# Patient Record
Sex: FEMALE | Race: WHITE | NOT HISPANIC OR LATINO | URBAN - METROPOLITAN AREA
[De-identification: names, ages, dates, MRNs, and addresses within clinical notes are randomized per-mention and may not be internally consistent; named-entity substitution may affect disease eponyms.]

---

## 2017-01-10 ENCOUNTER — OUTPATIENT (OUTPATIENT)
Dept: OUTPATIENT SERVICES | Facility: HOSPITAL | Age: 62
LOS: 1 days | End: 2017-01-10
Payer: COMMERCIAL

## 2017-01-10 ENCOUNTER — APPOINTMENT (OUTPATIENT)
Dept: MAMMOGRAPHY | Facility: CLINIC | Age: 62
End: 2017-01-10

## 2017-01-10 DIAGNOSIS — Z00.00 ENCOUNTER FOR GENERAL ADULT MEDICAL EXAMINATION WITHOUT ABNORMAL FINDINGS: ICD-10-CM

## 2017-01-10 PROCEDURE — 77063 BREAST TOMOSYNTHESIS BI: CPT

## 2017-01-10 PROCEDURE — 77067 SCR MAMMO BI INCL CAD: CPT

## 2017-07-03 ENCOUNTER — EMERGENCY (EMERGENCY)
Facility: HOSPITAL | Age: 62
LOS: 1 days | Discharge: HOME | End: 2017-07-03
Attending: EMERGENCY MEDICINE
Payer: COMMERCIAL

## 2017-07-03 VITALS
TEMPERATURE: 98 F | HEART RATE: 70 BPM | RESPIRATION RATE: 20 BRPM | SYSTOLIC BLOOD PRESSURE: 136 MMHG | WEIGHT: 184.97 LBS | DIASTOLIC BLOOD PRESSURE: 88 MMHG | OXYGEN SATURATION: 97 %

## 2017-07-03 PROCEDURE — 25605 CLTX DST RDL FX/EPHYS SEP W/: CPT | Mod: LT

## 2017-07-03 PROCEDURE — 73110 X-RAY EXAM OF WRIST: CPT

## 2017-07-03 PROCEDURE — 73100 X-RAY EXAM OF WRIST: CPT

## 2017-07-03 PROCEDURE — 99284 EMERGENCY DEPT VISIT MOD MDM: CPT | Mod: 25

## 2017-07-03 PROCEDURE — 99285 EMERGENCY DEPT VISIT HI MDM: CPT | Mod: 25

## 2017-07-03 PROCEDURE — 73110 X-RAY EXAM OF WRIST: CPT | Mod: 26,76,59

## 2017-07-03 RX ORDER — IBUPROFEN 200 MG
1 TABLET ORAL
Qty: 20 | Refills: 0 | OUTPATIENT
Start: 2017-07-03 | End: 2017-07-08

## 2017-07-03 RX ORDER — IBUPROFEN 200 MG
600 TABLET ORAL ONCE
Qty: 0 | Refills: 0 | Status: COMPLETED | OUTPATIENT
Start: 2017-07-03 | End: 2017-07-03

## 2017-07-03 RX ADMIN — Medication 600 MILLIGRAM(S): at 08:53

## 2017-07-03 NOTE — ED STATDOCS - ATTENDING CONTRIBUTION TO CARE
I, Eddy Hilliard, performed the initial face to face bedside interview with this patient regarding history of present illness, review of symptoms and relevant past medical, social and family history.  I completed an independent physical examination.  I was the provider who initially evaluated this patient.  The history, relevant review of systems, past medical and surgical history, medical decision making, and physical examination was documented by the scribe in my presence and I attest to the accuracy of the documentation. Follow-up on ordered tests (ie labs, radiologic studies) and re-evaluation of the patient's status has been communicated to the ACP.  Disposition of the patient will be based on test outcome and response to ED interventions.

## 2017-07-03 NOTE — PROCEDURE NOTE - NSPOSTCAREGUIDE_GEN_A_CORE
Verbal/written post procedure instructions were given to patient/caregiver/Keep the cast/splint/dressing clean and dry/Understanding of care for injured extremity verbalized/Elevate the injured extremity as instructed

## 2017-07-03 NOTE — ED STATDOCS - OBJECTIVE STATEMENT
63 y/o F presents to ED c/o L hand pain s/p fall yesterday. Pt states she slipped going her pool yesterday and fell on her buttocks and L hand. Pain is exacerbated with movement. Pt is right hand dominant. Denies numbness, tingling or weakness to affected area. PMD: Dr. Charles 61 y/o F presents to ED c/o L wrist/hand pain s/p fall yesterday. Pt states she slipped going her pool yesterday and fell on her buttocks and L hand. Pain is exacerbated with movement. Pt is right hand dominant. Denies numbness, tingling or weakness to affected area. PMD: Dr. Charles

## 2017-07-03 NOTE — ED STATDOCS - PROGRESS NOTE DETAILS
+FX to distal radius ortho called for evaluation patient re-evaluated c/o left wrist pain s/o fall yesterday, PE: +swelling to radius region, limited ROM secondary to pain, cap refill < 2sec, motor and sensory sensation intact, skin warm and dry. XR consisted with FX, pending ortho eval, plan sling ice, elevate, f/u outpatient with ortho

## 2017-07-03 NOTE — ED STATDOCS - MUSCULOSKELETAL, MLM
Mild snuff box TTP. TTP over the radial styloid process. No ulnar styloid TTP. FROM into elbow of the LUE. STS of left wrist. Mild snuff box TTP. TTP over the radial styloid process. No ulnar styloid TTP. FROM into elbow of the LUE.

## 2017-07-03 NOTE — ED ADULT NURSE NOTE - OBJECTIVE STATEMENT
pt received in supertrack. pt is awake alert oriented following commands and speaking coherently. presents to er complaining of left wrist pain secondary to a fall yesterday. 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 she denies hitting her head. strong radial pulse to left wrist. swelling and deformity noted. no other complaints. resp even and unlabored. limited ROM to left hand

## 2017-07-03 NOTE — CONSULT NOTE ADULT - SUBJECTIVE AND OBJECTIVE BOX
asked to ED to evaluate 63 y/o female with left distal radius fx s/p mechanical fall last night at aprox 8pm. nstates was gettng into pool and slipped on pool steps falling back on outstreched hand. denies head trauma, LOC. Reports no other significant complaints.    PAST MEDICAL & SURGICAL HISTORY:  Denies    Vital Signs Last 24 Hrs  T(C): 36.8 (03 Jul 2017 07:59), Max: 36.8 (03 Jul 2017 07:59)  T(F): 98.2 (03 Jul 2017 07:59), Max: 98.2 (03 Jul 2017 07:59)  HR: 70 (03 Jul 2017 07:59) (70 - 70)  BP: 136/88 (03 Jul 2017 07:59) (136/88 - 136/88)  BP(mean): --  RR: 20 (03 Jul 2017 07:59) (20 - 20)  SpO2: 97% (03 Jul 2017 07:59) (97% - 97%) asked to ED to evaluate 61 y/o female with left distal radius fx s/p mechanical fall last night at aprox 8pm. nstates was gettng into pool and slipped on pool steps falling back on outstreched hand. denies head trauma, LOC. Reports no other significant complaints.    PAST MEDICAL & SURGICAL HISTORY:  Denies    MEDS: Denies    Allergies    No Known Allergies    Intolerances    ROS  musculoskeletal:   left wrist pain and swelling, denies N/T  all other pertinent ROS negative    Vital Signs Last 24 Hrs  T(C): 36.8 (03 Jul 2017 07:59), Max: 36.8 (03 Jul 2017 07:59)  T(F): 98.2 (03 Jul 2017 07:59), Max: 98.2 (03 Jul 2017 07:59)  HR: 70 (03 Jul 2017 07:59) (70 - 70)  BP: 136/88 (03 Jul 2017 07:59) (136/88 - 136/88)  BP(mean): --  RR: 20 (03 Jul 2017 07:59) (20 - 20)  SpO2: 97% (03 Jul 2017 07:59) (97% - 97%)    PE LUE:  + left wrist swelling, mild no obvious deformity slight palpable deformity, mild ecchymosis medially, skin intact  gross motor intact: + intrinsic and thumb ROM  gross distal sensation intact to light touch  pulses appreciated, cap refill < 2 sec    A/P 61 y/o female with left distal radius fx    hematoma block and fracture reduction performed, sugar tong splint applied, (see procedure note)  pain control  maintain splint, keep clean and dry, elevate extremity to help reduce swelling  shoulder sling provided for comfort  follow up with Dr. Cardoza in one week

## 2017-07-11 ENCOUNTER — APPOINTMENT (OUTPATIENT)
Dept: ORTHOPEDIC SURGERY | Facility: CLINIC | Age: 62
End: 2017-07-11

## 2017-07-11 VITALS
DIASTOLIC BLOOD PRESSURE: 90 MMHG | HEART RATE: 60 BPM | BODY MASS INDEX: 26.66 KG/M2 | HEIGHT: 69 IN | SYSTOLIC BLOOD PRESSURE: 140 MMHG | WEIGHT: 180 LBS

## 2017-07-11 DIAGNOSIS — Z82.62 FAMILY HISTORY OF OSTEOPOROSIS: ICD-10-CM

## 2017-07-11 DIAGNOSIS — S62.102A FRACTURE OF UNSPECIFIED CARPAL BONE, LEFT WRIST, INITIAL ENCOUNTER FOR CLOSED FRACTURE: ICD-10-CM

## 2017-07-11 DIAGNOSIS — Z82.61 FAMILY HISTORY OF ARTHRITIS: ICD-10-CM

## 2017-08-01 ENCOUNTER — APPOINTMENT (OUTPATIENT)
Dept: ORTHOPEDIC SURGERY | Facility: CLINIC | Age: 62
End: 2017-08-01
Payer: COMMERCIAL

## 2017-08-01 VITALS
HEART RATE: 55 BPM | DIASTOLIC BLOOD PRESSURE: 89 MMHG | SYSTOLIC BLOOD PRESSURE: 150 MMHG | WEIGHT: 180 LBS | HEIGHT: 69 IN | BODY MASS INDEX: 26.66 KG/M2

## 2017-08-01 DIAGNOSIS — S52.532A COLLES' FRACTURE OF LEFT RADIUS, INITIAL ENCOUNTER FOR CLOSED FRACTURE: ICD-10-CM

## 2017-08-01 PROCEDURE — 73110 X-RAY EXAM OF WRIST: CPT | Mod: LT

## 2017-08-01 PROCEDURE — 99024 POSTOP FOLLOW-UP VISIT: CPT

## 2017-08-07 ENCOUNTER — OUTPATIENT (OUTPATIENT)
Dept: OUTPATIENT SERVICES | Facility: HOSPITAL | Age: 62
LOS: 1 days | End: 2017-08-07
Payer: COMMERCIAL

## 2017-08-07 DIAGNOSIS — Z51.89 ENCOUNTER FOR OTHER SPECIFIED AFTERCARE: ICD-10-CM

## 2017-08-07 DIAGNOSIS — S52.532D COLLES' FRACTURE OF LEFT RADIUS, SUBSEQUENT ENCOUNTER FOR CLOSED FRACTURE WITH ROUTINE HEALING: ICD-10-CM

## 2017-08-07 DIAGNOSIS — M25.632 STIFFNESS OF LEFT WRIST, NOT ELSEWHERE CLASSIFIED: ICD-10-CM

## 2017-09-14 ENCOUNTER — APPOINTMENT (OUTPATIENT)
Dept: ORTHOPEDIC SURGERY | Facility: CLINIC | Age: 62
End: 2017-09-14
Payer: COMMERCIAL

## 2017-09-14 VITALS
BODY MASS INDEX: 26.66 KG/M2 | HEIGHT: 69 IN | SYSTOLIC BLOOD PRESSURE: 132 MMHG | HEART RATE: 62 BPM | DIASTOLIC BLOOD PRESSURE: 88 MMHG | WEIGHT: 180 LBS

## 2017-09-14 DIAGNOSIS — S52.532D COLLES' FRACTURE OF LEFT RADIUS, SUBSEQUENT ENCOUNTER FOR CLOSED FRACTURE WITH ROUTINE HEALING: ICD-10-CM

## 2017-09-14 PROCEDURE — 99024 POSTOP FOLLOW-UP VISIT: CPT

## 2017-09-14 PROCEDURE — 73110 X-RAY EXAM OF WRIST: CPT | Mod: LT

## 2017-11-14 PROCEDURE — 97750 PHYSICAL PERFORMANCE TEST: CPT

## 2017-11-14 PROCEDURE — 97140 MANUAL THERAPY 1/> REGIONS: CPT

## 2017-11-14 PROCEDURE — 97110 THERAPEUTIC EXERCISES: CPT

## 2017-11-14 PROCEDURE — 97166 OT EVAL MOD COMPLEX 45 MIN: CPT

## 2017-11-17 ENCOUNTER — APPOINTMENT (OUTPATIENT)
Dept: NEUROLOGY | Facility: CLINIC | Age: 62
End: 2017-11-17
Payer: COMMERCIAL

## 2017-11-17 VITALS
WEIGHT: 178 LBS | SYSTOLIC BLOOD PRESSURE: 132 MMHG | BODY MASS INDEX: 26.36 KG/M2 | HEIGHT: 69 IN | DIASTOLIC BLOOD PRESSURE: 80 MMHG

## 2017-11-17 DIAGNOSIS — R25.3 FASCICULATION: ICD-10-CM

## 2017-11-17 PROCEDURE — 99204 OFFICE O/P NEW MOD 45 MIN: CPT

## 2017-11-27 ENCOUNTER — APPOINTMENT (OUTPATIENT)
Dept: ORTHOPEDIC SURGERY | Facility: CLINIC | Age: 62
End: 2017-11-27

## 2017-12-28 ENCOUNTER — OTHER (OUTPATIENT)
Age: 62
End: 2017-12-28

## 2018-01-15 ENCOUNTER — APPOINTMENT (OUTPATIENT)
Dept: GASTROENTEROLOGY | Facility: CLINIC | Age: 63
End: 2018-01-15

## 2018-02-13 ENCOUNTER — APPOINTMENT (OUTPATIENT)
Dept: MAMMOGRAPHY | Facility: CLINIC | Age: 63
End: 2018-02-13

## 2018-03-31 ENCOUNTER — EMERGENCY (EMERGENCY)
Facility: HOSPITAL | Age: 63
LOS: 1 days | Discharge: HOME | End: 2018-03-31
Attending: EMERGENCY MEDICINE
Payer: COMMERCIAL

## 2018-03-31 VITALS
RESPIRATION RATE: 18 BRPM | DIASTOLIC BLOOD PRESSURE: 76 MMHG | HEART RATE: 98 BPM | SYSTOLIC BLOOD PRESSURE: 105 MMHG | TEMPERATURE: 98 F | OXYGEN SATURATION: 98 %

## 2018-03-31 VITALS — WEIGHT: 177.91 LBS | HEIGHT: 69 IN

## 2018-03-31 DIAGNOSIS — Z79.1 LONG TERM (CURRENT) USE OF NON-STEROIDAL ANTI-INFLAMMATORIES (NSAID): ICD-10-CM
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
## 2018-03-31 DIAGNOSIS — S70.02XA CONTUSION OF LEFT HIP, INITIAL ENCOUNTER: ICD-10-CM
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
## 2018-03-31 DIAGNOSIS — Z79.899 OTHER LONG TERM (CURRENT) DRUG THERAPY: ICD-10-CM

## 2018-03-31 DIAGNOSIS — M25.552 PAIN IN LEFT HIP: ICD-10-CM
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
## 2018-03-31 PROCEDURE — 99284 EMERGENCY DEPT VISIT MOD MDM: CPT | Mod: 25

## 2018-03-31 PROCEDURE — 96372 THER/PROPH/DIAG INJ SC/IM: CPT

## 2018-03-31 PROCEDURE — 72192 CT PELVIS W/O DYE: CPT

## 2018-03-31 PROCEDURE — 76377 3D RENDER W/INTRP POSTPROCES: CPT | Mod: 26

## 2018-03-31 PROCEDURE — 72192 CT PELVIS W/O DYE: CPT | Mod: 26

## 2018-03-31 PROCEDURE — 76377 3D RENDER W/INTRP POSTPROCES: CPT

## 2018-03-31 RX ORDER — MORPHINE SULFATE 50 MG/1
4 CAPSULE, EXTENDED RELEASE ORAL ONCE
Qty: 0 | Refills: 0 | Status: DISCONTINUED | OUTPATIENT
Start: 2018-03-31 | End: 2018-03-31

## 2018-03-31 RX ORDER — OXYCODONE AND ACETAMINOPHEN 5; 325 MG/1; MG/1
1 TABLET ORAL ONCE
Qty: 0 | Refills: 0 | Status: DISCONTINUED | OUTPATIENT
Start: 2018-03-31 | End: 2018-03-31

## 2018-03-31 RX ORDER — KETOROLAC TROMETHAMINE 30 MG/ML
1 SYRINGE (ML) INJECTION
Qty: 6 | Refills: 0 | OUTPATIENT
Start: 2018-03-31 | End: 2018-04-01

## 2018-03-31 RX ORDER — KETOROLAC TROMETHAMINE 30 MG/ML
30 SYRINGE (ML) INJECTION ONCE
Qty: 0 | Refills: 0 | Status: DISCONTINUED | OUTPATIENT
Start: 2018-03-31 | End: 2018-03-31

## 2018-03-31 RX ADMIN — MORPHINE SULFATE 4 MILLIGRAM(S): 50 CAPSULE, EXTENDED RELEASE ORAL at 09:04

## 2018-03-31 RX ADMIN — MORPHINE SULFATE 4 MILLIGRAM(S): 50 CAPSULE, EXTENDED RELEASE ORAL at 09:27

## 2018-03-31 RX ADMIN — Medication 30 MILLIGRAM(S): at 11:10

## 2018-03-31 RX ADMIN — OXYCODONE AND ACETAMINOPHEN 1 TABLET(S): 5; 325 TABLET ORAL at 08:14

## 2018-03-31 RX ADMIN — OXYCODONE AND ACETAMINOPHEN 1 TABLET(S): 5; 325 TABLET ORAL at 09:04

## 2018-03-31 NOTE — ED PROVIDER NOTE - OBJECTIVE STATEMENT
Patient is a 64 y/o female c/o of left hip pain s/p fall. Patient states she was in the supermarket yesterday and when she bumped into a cart and fell. Patient states she fell onto her left hip. Patient states she was able to ambulate without difficulty following the injury. However, patient states as the day went on she noticed the pain worsened and she was unable to walk on her left leg. Patient admits this morning she had to use a walker for assistance. Patient

## 2018-03-31 NOTE — PHYSICAL THERAPY INITIAL EVALUATION ADULT - ADDITIONAL COMMENTS
owns a RW but does not use it, 2 steps to enter home s rail, flight of stairs within home to bedroom, states family very supportive and will help out throughout the day prn

## 2018-03-31 NOTE — PHYSICAL THERAPY INITIAL EVALUATION ADULT - LEVEL OF INDEPENDENCE: STAIR NEGOTIATION, REHAB EVAL
pt declined stairs due to nausea however agreed to performed modified task at bedside with education. 6'' step ups c rail modified I, no rails 6'' steps ups Tank, pt educated on use of RW for stair negotiation without questions or concerns pt declined stairs due to nausea however agreed to performed modified task at bedside with education. 6'' step ups c rail modified I and 6'' step c bowen axillary crutches S, no rails, attempted 2x, pt educated on use of bowen axillary crutches for stair negotiation without questions or concerns

## 2018-03-31 NOTE — ED ADULT NURSE NOTE - OBJECTIVE STATEMENT
pt alert and awake x3, arrived to ED with left hip pain s/p fall yesterday, pt ambulated with walker, unable to bare weight, denies hitting head, denies chest pain/sob.

## 2018-03-31 NOTE — ED ADULT TRIAGE NOTE - CHIEF COMPLAINT QUOTE
Patient arrived to ED today with c/o left hip pain after fall in a supermarket yesterday.  Patient walking with assistance with a walker due to injury.

## 2018-03-31 NOTE — ED PROVIDER NOTE - ATTENDING CONTRIBUTION TO CARE
I, Megan Ross, independently evaluated the patient. The PA made the initial evaluation and discussed history, physical and plan with me and I agree. I examined the patient noting a large left buttock hematoma, stable pelvis, and discussed need for CT, pain medication, ice and PT to see. I discussed indications to return to the ED and the importance of proper follow up with PMD. Patient verbalizes understanding and is comfortable with discharge at this time.

## 2018-03-31 NOTE — PROVIDER CONTACT NOTE (OTHER) - ASSESSMENT
PT ordered. chart reviewed and noted. pt received in ED, in supine, NAD, agreeable to PT, son present, PA discussion(no acute fracture, WBAT left LE agreeable to PT). pt tolerated session well, son and patient educated on functional mobility(stairs/gait/transfers) without questions or concerns. PA and RN aware of pain and function. left hip pain pre 5/10, post 7/10. pt left as received, son present, will follow

## 2018-03-31 NOTE — PROVIDER CONTACT NOTE (OTHER) - RECOMMENDATIONS
D/C recommendation: home, use of her RW, assist prn D/C recommendation: home, use of her RW vs bowen axillary crutches, assist prn

## 2018-03-31 NOTE — ED PROVIDER NOTE - PHYSICAL EXAMINATION
General: Well appearing, in no distress, sitting comfortably Eyes: PERRL, conjunctiva pink, sclera white bilaterally Cardio: Regular rate and rhythm, S1/S2, no murmurs Resp: Clear to auscultation bilaterally no wheezes, rales or rhonchi MSK: Tenderness over left hip, decreased ROM due to pain, neurovasculry intact, femoral pulse 2+ Skin: Warm, dry without ecchymosis or erythema Neuro: Alert and orientated, CN II-XII intact, finger to nose intact, muscle strength fair, gait without ataxia, reflexes intact

## 2018-07-16 ENCOUNTER — APPOINTMENT (OUTPATIENT)
Dept: GASTROENTEROLOGY | Facility: CLINIC | Age: 63
End: 2018-07-16
Payer: COMMERCIAL

## 2018-07-16 VITALS
BODY MASS INDEX: 24.88 KG/M2 | WEIGHT: 168 LBS | SYSTOLIC BLOOD PRESSURE: 116 MMHG | DIASTOLIC BLOOD PRESSURE: 92 MMHG | HEIGHT: 69 IN

## 2018-07-16 DIAGNOSIS — Z80.0 FAMILY HISTORY OF MALIGNANT NEOPLASM OF DIGESTIVE ORGANS: ICD-10-CM

## 2018-07-16 PROCEDURE — 99203 OFFICE O/P NEW LOW 30 MIN: CPT

## 2018-07-16 RX ORDER — VIT C/E/CUPERIC/ZINC/LUTEIN 226-90-0.8
CAPSULE ORAL
Refills: 0 | Status: ACTIVE | COMMUNITY

## 2018-07-17 PROBLEM — Z80.0 FAMILY HISTORY OF MALIGNANT NEOPLASM OF COLON: Status: ACTIVE | Noted: 2018-07-17

## 2018-07-20 ENCOUNTER — NON-APPOINTMENT (OUTPATIENT)
Age: 63
End: 2018-07-20

## 2018-07-20 ENCOUNTER — APPOINTMENT (OUTPATIENT)
Dept: INTERNAL MEDICINE | Facility: CLINIC | Age: 63
End: 2018-07-20
Payer: COMMERCIAL

## 2018-07-20 VITALS
HEIGHT: 69 IN | SYSTOLIC BLOOD PRESSURE: 115 MMHG | WEIGHT: 171.2 LBS | BODY MASS INDEX: 25.36 KG/M2 | DIASTOLIC BLOOD PRESSURE: 80 MMHG

## 2018-07-20 DIAGNOSIS — M20.30 HALLUX VARUS (ACQUIRED), UNSPECIFIED FOOT: ICD-10-CM

## 2018-07-20 PROCEDURE — 99386 PREV VISIT NEW AGE 40-64: CPT | Mod: 25

## 2018-07-20 PROCEDURE — 93000 ELECTROCARDIOGRAM COMPLETE: CPT

## 2018-07-20 RX ORDER — SODIUM SULFATE, POTASSIUM SULFATE, MAGNESIUM SULFATE 17.5; 3.13; 1.6 G/ML; G/ML; G/ML
17.5-3.13-1.6 SOLUTION, CONCENTRATE ORAL
Qty: 1 | Refills: 0 | Status: DISCONTINUED | COMMUNITY
Start: 2018-07-16 | End: 2018-07-20

## 2018-07-20 NOTE — PLAN
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 She is overall feeling well.  She brings in some blood work from 2 years ago It   showed mildly elevated cholesterol high HDL. Overall feels okay. She is scheduled for colonoscopy with Dr. Canales. I will send her for a mammogram\par . I will check yearly bloodwork constance cholesterol. EKG Done HERE NORMAL WILL D/W RADIOLOGY INCIDNETAL ATHEROSCLEROSIS   ON CT SCAN

## 2018-07-20 NOTE — HISTORY OF PRESENT ILLNESS
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

## 2018-08-27 LAB
ALBUMIN SERPL ELPH-MCNC: 4.4 G/DL
ALP BLD-CCNC: 46 U/L
ALT SERPL-CCNC: 21 U/L
ANION GAP SERPL CALC-SCNC: 11 MMOL/L
AST SERPL-CCNC: 24 U/L
BASOPHILS # BLD AUTO: 0.04 K/UL
BASOPHILS NFR BLD AUTO: 0.8 %
BILIRUB SERPL-MCNC: 0.4 MG/DL
BUN SERPL-MCNC: 21 MG/DL
CALCIUM SERPL-MCNC: 9.3 MG/DL
CHLORIDE SERPL-SCNC: 100 MMOL/L
CHOLEST SERPL-MCNC: 216 MG/DL
CHOLEST/HDLC SERPL: 2.5 RATIO
CO2 SERPL-SCNC: 25 MMOL/L
CREAT SERPL-MCNC: 0.73 MG/DL
EOSINOPHIL # BLD AUTO: 0.23 K/UL
EOSINOPHIL NFR BLD AUTO: 4.8 %
GLUCOSE SERPL-MCNC: 79 MG/DL
HCT VFR BLD CALC: 41.3 %
HDLC SERPL-MCNC: 85 MG/DL
HGB BLD-MCNC: 13.2 G/DL
IMM GRANULOCYTES NFR BLD AUTO: 0.2 %
INR PPP: 0.99 RATIO
LDLC SERPL CALC-MCNC: 121 MG/DL
LYMPHOCYTES # BLD AUTO: 2.15 K/UL
LYMPHOCYTES NFR BLD AUTO: 44.7 %
MAN DIFF?: NORMAL
MCHC RBC-ENTMCNC: 30.8 PG
MCHC RBC-ENTMCNC: 32 GM/DL
MCV RBC AUTO: 96.5 FL
MONOCYTES # BLD AUTO: 0.45 K/UL
MONOCYTES NFR BLD AUTO: 9.4 %
NEUTROPHILS # BLD AUTO: 1.93 K/UL
NEUTROPHILS NFR BLD AUTO: 40.1 %
PLATELET # BLD AUTO: 251 K/UL
POTASSIUM SERPL-SCNC: 4.2 MMOL/L
PROT SERPL-MCNC: 6.2 G/DL
PT BLD: 11.2 SEC
RBC # BLD: 4.28 M/UL
RBC # FLD: 14.6 %
SODIUM SERPL-SCNC: 136 MMOL/L
TRIGL SERPL-MCNC: 49 MG/DL
WBC # FLD AUTO: 4.81 K/UL

## 2018-08-28 LAB
ESTIMATED AVERAGE GLUCOSE: 103 MG/DL
HBA1C MFR BLD HPLC: 5.2 %

## 2018-09-06 ENCOUNTER — OUTPATIENT (OUTPATIENT)
Dept: OUTPATIENT SERVICES | Facility: HOSPITAL | Age: 63
LOS: 1 days | End: 2018-09-06
Payer: COMMERCIAL

## 2018-09-06 ENCOUNTER — APPOINTMENT (OUTPATIENT)
Dept: GASTROENTEROLOGY | Facility: GI CENTER | Age: 63
End: 2018-09-06
Payer: COMMERCIAL

## 2018-09-06 DIAGNOSIS — Z12.11 ENCOUNTER FOR SCREENING FOR MALIGNANT NEOPLASM OF COLON: ICD-10-CM

## 2018-09-06 DIAGNOSIS — K57.30 DIVERTICULOSIS OF LARGE INTESTINE W/OUT PERFORATION OR ABSCESS W/OUT BLEEDING: ICD-10-CM

## 2018-09-06 DIAGNOSIS — K64.8 OTHER HEMORRHOIDS: ICD-10-CM

## 2018-09-06 PROCEDURE — G0121: CPT

## 2018-09-06 PROCEDURE — 45378 DIAGNOSTIC COLONOSCOPY: CPT

## 2018-09-13 ENCOUNTER — OTHER (OUTPATIENT)
Age: 63
End: 2018-09-13

## 2018-10-09 LAB
ALBUMIN SERPL ELPH-MCNC: 4.4 G/DL
ALP BLD-CCNC: 47 U/L
ALT SERPL-CCNC: 21 U/L
ANION GAP SERPL CALC-SCNC: 15 MMOL/L
APPEARANCE: CLEAR
AST SERPL-CCNC: 24 U/L
BASOPHILS # BLD AUTO: 0.05 K/UL
BASOPHILS NFR BLD AUTO: 1.1 %
BILIRUB SERPL-MCNC: 0.4 MG/DL
BILIRUBIN URINE: NEGATIVE
BLOOD URINE: NEGATIVE
BUN SERPL-MCNC: 22 MG/DL
CALCIUM SERPL-MCNC: 9.1 MG/DL
CHLORIDE SERPL-SCNC: 98 MMOL/L
CHOLEST SERPL-MCNC: 207 MG/DL
CHOLEST/HDLC SERPL: 2.5 RATIO
CO2 SERPL-SCNC: 24 MMOL/L
COLOR: YELLOW
CREAT SERPL-MCNC: 0.72 MG/DL
EOSINOPHIL # BLD AUTO: 0.21 K/UL
EOSINOPHIL NFR BLD AUTO: 4.4 %
GLUCOSE QUALITATIVE U: NEGATIVE MG/DL
GLUCOSE SERPL-MCNC: 86 MG/DL
HBA1C MFR BLD HPLC: 5.3 %
HCT VFR BLD CALC: 40.9 %
HDLC SERPL-MCNC: 84 MG/DL
HGB BLD-MCNC: 12.8 G/DL
IMM GRANULOCYTES NFR BLD AUTO: 0.2 %
INR PPP: 1.03 RATIO
KETONES URINE: NEGATIVE
LDLC SERPL CALC-MCNC: 113 MG/DL
LEUKOCYTE ESTERASE URINE: NEGATIVE
LYMPHOCYTES # BLD AUTO: 2.17 K/UL
LYMPHOCYTES NFR BLD AUTO: 45.9 %
MAN DIFF?: NORMAL
MCHC RBC-ENTMCNC: 29.4 PG
MCHC RBC-ENTMCNC: 31.3 GM/DL
MCV RBC AUTO: 93.8 FL
MONOCYTES # BLD AUTO: 0.4 K/UL
MONOCYTES NFR BLD AUTO: 8.5 %
NEUTROPHILS # BLD AUTO: 1.89 K/UL
NEUTROPHILS NFR BLD AUTO: 39.9 %
NITRITE URINE: NEGATIVE
PH URINE: 6.5
PLATELET # BLD AUTO: 230 K/UL
POTASSIUM SERPL-SCNC: 4.3 MMOL/L
PROT SERPL-MCNC: 6.1 G/DL
PROTEIN URINE: NEGATIVE MG/DL
PT BLD: 11.6 SEC
RBC # BLD: 4.36 M/UL
RBC # FLD: 14.7 %
SODIUM SERPL-SCNC: 137 MMOL/L
SPECIFIC GRAVITY URINE: 1.01
T4 SERPL-MCNC: 5.8 UG/DL
TRIGL SERPL-MCNC: 49 MG/DL
TSH SERPL-ACNC: 2.04 UIU/ML
UROBILINOGEN URINE: NEGATIVE MG/DL
WBC # FLD AUTO: 4.73 K/UL

## 2019-04-03 ENCOUNTER — APPOINTMENT (OUTPATIENT)
Dept: INTERNAL MEDICINE | Facility: CLINIC | Age: 64
End: 2019-04-03
Payer: COMMERCIAL

## 2019-04-03 VITALS
WEIGHT: 171 LBS | DIASTOLIC BLOOD PRESSURE: 94 MMHG | BODY MASS INDEX: 25.33 KG/M2 | SYSTOLIC BLOOD PRESSURE: 130 MMHG | HEIGHT: 69 IN

## 2019-04-03 DIAGNOSIS — R21 RASH AND OTHER NONSPECIFIC SKIN ERUPTION: ICD-10-CM

## 2019-04-03 PROCEDURE — 99203 OFFICE O/P NEW LOW 30 MIN: CPT

## 2019-04-03 NOTE — PHYSICAL EXAM

## 2019-04-03 NOTE — HISTORY OF PRESENT ILLNESS
[FreeTextEntry1] : rash [de-identified] : 64 year old woman with no significant past medical history is here with rash on her face and neck since 3 days ago.\par 4 days ago she had facial and started using the creams given in spa, one day after using the products rash and redness started apearing on her face and neck exactly the same areas she used the creams and had facial. \par She hasn't started any new medications or any other new clothes or cream or soap.\par

## 2019-04-03 NOTE — ASSESSMENT
[FreeTextEntry1] : Allergic reaction creams and new products\par Will stop using them.\par We will send steroid cream to be used twice a day for one week.\par Hydroxyzine 25 mg at bedtime, side effects discussed.\par If rash doesn't resolve in 3 or 4 days will come back.

## 2019-04-05 ENCOUNTER — APPOINTMENT (OUTPATIENT)
Dept: DERMATOLOGY | Facility: CLINIC | Age: 64
End: 2019-04-05
Payer: COMMERCIAL

## 2019-04-05 PROCEDURE — 99203 OFFICE O/P NEW LOW 30 MIN: CPT

## 2019-05-03 DIAGNOSIS — Z12.31 ENCOUNTER FOR SCREENING MAMMOGRAM FOR MALIGNANT NEOPLASM OF BREAST: ICD-10-CM

## 2019-05-06 ENCOUNTER — OUTPATIENT (OUTPATIENT)
Dept: OUTPATIENT SERVICES | Facility: HOSPITAL | Age: 64
LOS: 1 days | End: 2019-05-06
Payer: COMMERCIAL

## 2019-05-06 ENCOUNTER — APPOINTMENT (OUTPATIENT)
Dept: MAMMOGRAPHY | Facility: CLINIC | Age: 64
End: 2019-05-06
Payer: COMMERCIAL

## 2019-05-06 DIAGNOSIS — Z12.31 ENCOUNTER FOR SCREENING MAMMOGRAM FOR MALIGNANT NEOPLASM OF BREAST: ICD-10-CM

## 2019-05-06 PROCEDURE — 77063 BREAST TOMOSYNTHESIS BI: CPT | Mod: 26

## 2019-05-06 PROCEDURE — 77067 SCR MAMMO BI INCL CAD: CPT

## 2019-05-06 PROCEDURE — 77063 BREAST TOMOSYNTHESIS BI: CPT

## 2019-05-06 PROCEDURE — 77067 SCR MAMMO BI INCL CAD: CPT | Mod: 26

## 2019-05-17 ENCOUNTER — TRANSCRIPTION ENCOUNTER (OUTPATIENT)
Age: 64
End: 2019-05-17

## 2019-05-23 ENCOUNTER — APPOINTMENT (OUTPATIENT)
Dept: ORTHOPEDIC SURGERY | Facility: CLINIC | Age: 64
End: 2019-05-23
Payer: COMMERCIAL

## 2019-05-23 VITALS — BODY MASS INDEX: 25.33 KG/M2 | HEIGHT: 69 IN | WEIGHT: 171 LBS

## 2019-05-23 DIAGNOSIS — M17.11 UNILATERAL PRIMARY OSTEOARTHRITIS, RIGHT KNEE: ICD-10-CM

## 2019-05-23 PROCEDURE — 73562 X-RAY EXAM OF KNEE 3: CPT | Mod: RT

## 2019-05-23 PROCEDURE — 99213 OFFICE O/P EST LOW 20 MIN: CPT

## 2019-05-23 NOTE — DISCUSSION/SUMMARY
[de-identified] : Discussion had with patient, deferred injection\par Patient will follow up with Nett\par Patient aware must follow up with MD for further eval and treatment \par Patient will start Physical Therapy \par Patients questions were answered and patient is satisfied with today's visit\par

## 2019-05-23 NOTE — HISTORY OF PRESENT ILLNESS
[Pain Location] : pain [de-identified] : Patient is a 64-year-old female who presents complaining of right knee pain. Patient states pain started 2 weeks ago after standing for long periods. Patient states pain and presented after working on her house going up and down stairs. Patient p and the medial aspect of the knee. Patient has been taking anti-inflammatories which has decreased the pain. Positive clicking to the knee. Patient does have history of Baker's cyst to knee.

## 2019-05-23 NOTE — PHYSICAL EXAM
[UE/LE] : Sensory: Intact in bilateral upper & lower extremities [ALL] : dorsalis pedis, posterior tibial, femoral, popliteal, and radial 2+ and symmetric bilaterally [Normal] : Oriented to person, place, and time, insight and judgement were intact and the affect was normal [Poor Appearance] : well-appearing [Acute Distress] : not in acute distress [de-identified] : \par FROM to hip\par \par Skin Warm, Dry, no erythema, no lesions \par \par Knee \par stable\par anatomic alignment\par ROM 0-120\par Valgus/Varus Stress intact \par Effusion - mild\par Crepitus - positive\par Patella Grind - Negative \par Patella Apprehension - Negative \par Medial joint line tenderness - positive \par Lateral Joint line tenderness - Negative\par Elke Test - Negative  \par Lachman test - Negative \par Anterior Drawer Test -  Negative \par \par Distal Motor Function intact \par Sensation intact to light touch bilaterally \par Pulses 2+ bilaterally\par  [de-identified] : 3 view right knee reveals Moderate to severe PF OA

## 2019-05-27 PROBLEM — M17.11 PATELLOFEMORAL ARTHRITIS OF RIGHT KNEE: Status: ACTIVE | Noted: 2019-05-23

## 2019-07-05 ENCOUNTER — APPOINTMENT (OUTPATIENT)
Dept: DERMATOLOGY | Facility: CLINIC | Age: 64
End: 2019-07-05
Payer: COMMERCIAL

## 2019-07-05 PROCEDURE — 99214 OFFICE O/P EST MOD 30 MIN: CPT

## 2020-02-18 ENCOUNTER — FORM ENCOUNTER (OUTPATIENT)
Age: 65
End: 2020-02-18

## 2020-02-18 ENCOUNTER — APPOINTMENT (OUTPATIENT)
Dept: ORTHOPEDIC SURGERY | Facility: CLINIC | Age: 65
End: 2020-02-18
Payer: COMMERCIAL

## 2020-02-18 VITALS — HEIGHT: 69 IN | HEART RATE: 60 BPM | DIASTOLIC BLOOD PRESSURE: 93 MMHG | SYSTOLIC BLOOD PRESSURE: 144 MMHG

## 2020-02-18 DIAGNOSIS — S86.819A STRAIN OF OTHER MUSCLE(S) AND TENDON(S) AT LOWER LEG LEVEL, UNSPECIFIED LEG, INITIAL ENCOUNTER: ICD-10-CM

## 2020-02-18 PROCEDURE — 99213 OFFICE O/P EST LOW 20 MIN: CPT

## 2020-02-19 ENCOUNTER — APPOINTMENT (OUTPATIENT)
Dept: ULTRASOUND IMAGING | Facility: CLINIC | Age: 65
End: 2020-02-19
Payer: COMMERCIAL

## 2020-02-19 ENCOUNTER — OUTPATIENT (OUTPATIENT)
Dept: OUTPATIENT SERVICES | Facility: HOSPITAL | Age: 65
LOS: 1 days | End: 2020-02-19
Payer: COMMERCIAL

## 2020-02-19 DIAGNOSIS — Z00.8 ENCOUNTER FOR OTHER GENERAL EXAMINATION: ICD-10-CM

## 2020-02-19 PROCEDURE — 93971 EXTREMITY STUDY: CPT

## 2020-02-19 PROCEDURE — 93971 EXTREMITY STUDY: CPT | Mod: 26

## 2020-04-25 ENCOUNTER — MESSAGE (OUTPATIENT)
Age: 65
End: 2020-04-25

## 2020-05-05 LAB
SARS-COV-2 IGG SERPL IA-ACNC: <0.1 INDEX
SARS-COV-2 IGG SERPL QL IA: NEGATIVE

## 2020-07-06 ENCOUNTER — APPOINTMENT (OUTPATIENT)
Dept: DERMATOLOGY | Facility: CLINIC | Age: 65
End: 2020-07-06
Payer: COMMERCIAL

## 2020-07-06 PROCEDURE — 99214 OFFICE O/P EST MOD 30 MIN: CPT

## 2020-11-04 ENCOUNTER — APPOINTMENT (OUTPATIENT)
Dept: INTERNAL MEDICINE | Facility: CLINIC | Age: 65
End: 2020-11-04
Payer: COMMERCIAL

## 2020-11-04 ENCOUNTER — NON-APPOINTMENT (OUTPATIENT)
Age: 65
End: 2020-11-04

## 2020-11-04 VITALS
RESPIRATION RATE: 16 BRPM | TEMPERATURE: 97.1 F | SYSTOLIC BLOOD PRESSURE: 136 MMHG | DIASTOLIC BLOOD PRESSURE: 88 MMHG | HEIGHT: 69 IN | HEART RATE: 61 BPM

## 2020-11-04 DIAGNOSIS — G47.00 INSOMNIA, UNSPECIFIED: ICD-10-CM

## 2020-11-04 DIAGNOSIS — Z23 ENCOUNTER FOR IMMUNIZATION: ICD-10-CM

## 2020-11-04 PROCEDURE — 99072 ADDL SUPL MATRL&STAF TM PHE: CPT

## 2020-11-04 PROCEDURE — 93000 ELECTROCARDIOGRAM COMPLETE: CPT

## 2020-11-04 PROCEDURE — 90732 PPSV23 VACC 2 YRS+ SUBQ/IM: CPT

## 2020-11-04 PROCEDURE — G0009: CPT

## 2020-11-04 PROCEDURE — 99397 PER PM REEVAL EST PAT 65+ YR: CPT | Mod: 25

## 2020-11-04 PROCEDURE — 36415 COLL VENOUS BLD VENIPUNCTURE: CPT

## 2020-11-04 NOTE — ASSESSMENT
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

## 2020-11-04 NOTE — HISTORY OF PRESENT ILLNESS
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

## 2020-11-11 ENCOUNTER — TRANSCRIPTION ENCOUNTER (OUTPATIENT)
Age: 65
End: 2020-11-11

## 2020-11-15 LAB
25(OH)D3 SERPL-MCNC: 30.7 NG/ML
ALBUMIN SERPL ELPH-MCNC: 4.8 G/DL
ALP BLD-CCNC: 57 U/L
ALT SERPL-CCNC: 30 U/L
ANION GAP SERPL CALC-SCNC: 11 MMOL/L
APPEARANCE: CLEAR
AST SERPL-CCNC: 31 U/L
BASOPHILS # BLD AUTO: 0.08 K/UL
BASOPHILS NFR BLD AUTO: 1.2 %
BILIRUB SERPL-MCNC: 0.7 MG/DL
BILIRUBIN URINE: NEGATIVE
BLOOD URINE: NEGATIVE
BUN SERPL-MCNC: 12 MG/DL
CALCIUM SERPL-MCNC: 10.2 MG/DL
CHLORIDE SERPL-SCNC: 101 MMOL/L
CHOLEST SERPL-MCNC: 230 MG/DL
CO2 SERPL-SCNC: 27 MMOL/L
COLOR: NORMAL
CREAT SERPL-MCNC: 0.8 MG/DL
EOSINOPHIL # BLD AUTO: 0.44 K/UL
EOSINOPHIL NFR BLD AUTO: 6.5 %
ESTIMATED AVERAGE GLUCOSE: 108 MG/DL
GLUCOSE QUALITATIVE U: NEGATIVE
GLUCOSE SERPL-MCNC: 90 MG/DL
HBA1C MFR BLD HPLC: 5.4 %
HCT VFR BLD CALC: 47 %
HDLC SERPL-MCNC: 95 MG/DL
HGB BLD-MCNC: 15.2 G/DL
IMM GRANULOCYTES NFR BLD AUTO: 0.1 %
KETONES URINE: NEGATIVE
LDLC SERPL CALC-MCNC: 123 MG/DL
LEUKOCYTE ESTERASE URINE: NEGATIVE
LYMPHOCYTES # BLD AUTO: 2.63 K/UL
LYMPHOCYTES NFR BLD AUTO: 38.7 %
MAN DIFF?: NORMAL
MCHC RBC-ENTMCNC: 30.4 PG
MCHC RBC-ENTMCNC: 32.3 GM/DL
MCV RBC AUTO: 94 FL
MONOCYTES # BLD AUTO: 0.48 K/UL
MONOCYTES NFR BLD AUTO: 7.1 %
NEUTROPHILS # BLD AUTO: 3.15 K/UL
NEUTROPHILS NFR BLD AUTO: 46.4 %
NITRITE URINE: NEGATIVE
NONHDLC SERPL-MCNC: 134 MG/DL
PH URINE: 5.5
PLATELET # BLD AUTO: 259 K/UL
POTASSIUM SERPL-SCNC: 4.5 MMOL/L
PROT SERPL-MCNC: 6.9 G/DL
PROTEIN URINE: NEGATIVE
RBC # BLD: 5 M/UL
RBC # FLD: 13.2 %
SODIUM SERPL-SCNC: 139 MMOL/L
SPECIFIC GRAVITY URINE: 1
T4 SERPL-MCNC: 6.3 UG/DL
TRIGL SERPL-MCNC: 58 MG/DL
TSH SERPL-ACNC: 1.44 UIU/ML
UROBILINOGEN URINE: NORMAL
WBC # FLD AUTO: 6.79 K/UL

## 2020-12-12 ENCOUNTER — EMERGENCY (EMERGENCY)
Facility: HOSPITAL | Age: 65
LOS: 1 days | Discharge: HOME | End: 2020-12-12
Attending: EMERGENCY MEDICINE
Payer: COMMERCIAL

## 2020-12-12 VITALS
SYSTOLIC BLOOD PRESSURE: 126 MMHG | WEIGHT: 173.94 LBS | TEMPERATURE: 98 F | OXYGEN SATURATION: 97 % | HEART RATE: 79 BPM | HEIGHT: 69 IN | RESPIRATION RATE: 18 BRPM | DIASTOLIC BLOOD PRESSURE: 89 MMHG

## 2020-12-12 PROCEDURE — 99284 EMERGENCY DEPT VISIT MOD MDM: CPT | Mod: 57

## 2020-12-12 PROCEDURE — 73110 X-RAY EXAM OF WRIST: CPT

## 2020-12-12 PROCEDURE — 25605 CLTX DST RDL FX/EPHYS SEP W/: CPT | Mod: 54

## 2020-12-12 PROCEDURE — 73110 X-RAY EXAM OF WRIST: CPT | Mod: 26,RT,76

## 2020-12-12 PROCEDURE — 25605 CLTX DST RDL FX/EPHYS SEP W/: CPT | Mod: RT

## 2020-12-12 PROCEDURE — 99285 EMERGENCY DEPT VISIT HI MDM: CPT | Mod: 25

## 2020-12-12 RX ORDER — OXYCODONE AND ACETAMINOPHEN 5; 325 MG/1; MG/1
1 TABLET ORAL ONCE
Refills: 0 | Status: DISCONTINUED | OUTPATIENT
Start: 2020-12-12 | End: 2020-12-12

## 2020-12-12 RX ORDER — OXYCODONE AND ACETAMINOPHEN 5; 325 MG/1; MG/1
1 TABLET ORAL
Qty: 12 | Refills: 0
Start: 2020-12-12 | End: 2020-12-14

## 2020-12-12 RX ADMIN — OXYCODONE AND ACETAMINOPHEN 1 TABLET(S): 5; 325 TABLET ORAL at 15:41

## 2020-12-12 NOTE — ED PROVIDER NOTE - CARE PROVIDER_API CALL
Anna Schultz)  Orthopedics  [address] [place] [address], [address]  [place] [address]  Phone: [phone]  Fax: [phone]  Follow Up Time:

## 2020-12-12 NOTE — ED ADULT NURSE NOTE - CHIEF COMPLAINT QUOTE
Pt. complaining of right wrist pain s/p mechanical trip and fall on stoop earlier today.  Obvious deformity to right wrist.

## 2020-12-12 NOTE — ED ADULT NURSE NOTE - NSIMPLEMENTINTERV_GEN_ALL_ED
Implemented All Fall Risk Interventions:  [place] to call system. Call bell, personal items and telephone within reach. Instruct patient to call for assistance. Room bathroom lighting operational. Non-slip footwear when patient is off stretcher. Physically safe environment: no spills, clutter or unnecessary equipment. Stretcher in lowest position, wheels locked, appropriate side rails in place. Provide visual cue, wrist band, yellow gown, etc. Monitor gait and stability. Monitor for mental status changes and reorient to person, place, and time. Review medications for side effects contributing to fall risk. Reinforce activity limits and safety measures with patient and family.

## 2020-12-12 NOTE — ED PROVIDER NOTE - PROGRESS NOTE DETAILS
Fracture reduced and splinted. D/w Dr. Schultz-- pt stable for d/c with outpt f/u. Recommended to elevate RUE whenever possible.

## 2020-12-12 NOTE — ED ADULT NURSE NOTE - OBJECTIVE STATEMENT
Patient A&Ox4 complaining of pain to right wrist secondary to trip & fall up steps landing on left wrist. Stated pain is 4/10 at rest, 10/10 with movement. Positive CMS, denies any numbness tingling. Presents with swelling to left wrist.

## 2020-12-12 NOTE — ED PROVIDER NOTE - ATTENDING CONTRIBUTION TO CARE
65yoF; with no signif pmh; now p/w right wrist pain s/p slip and fall while trying to put out garbage.  c/o right wrist pain. denies numbness/tingling. denies focal weakness.  denies head trauma. denies loc.  EXAM: ttp @radial wrist with edema, no overlying abrasion. radial and ulnar pulses intact  A/P:  65yoF p/w right wrist pain  -xray, splint, f/up with ortho

## 2020-12-12 NOTE — ED PROVIDER NOTE - OBJECTIVE STATEMENT
65F presents to the ED c/o right wrist pain x 1 day. Pt states that she slipped and fell off her stoop. Pt denies hitting her head, LOC, neck/back pain, numbness/tingling and has no other complaints at this time.

## 2020-12-12 NOTE — ED PROVIDER NOTE - PATIENT PORTAL LINK FT
You can access the FollowMyHealth Patient Portal offered by [facility] by registering at the following website: [email or web address]. By joining [employer]’s FollowMyHealth portal, you will also be able to view your health information using other applications (apps) compatible with our system.

## 2020-12-16 ENCOUNTER — APPOINTMENT (OUTPATIENT)
Dept: ORTHOPEDIC SURGERY | Facility: CLINIC | Age: 65
End: 2020-12-16
Payer: COMMERCIAL

## 2020-12-16 VITALS
SYSTOLIC BLOOD PRESSURE: 148 MMHG | WEIGHT: 172 LBS | HEART RATE: 61 BPM | BODY MASS INDEX: 25.48 KG/M2 | DIASTOLIC BLOOD PRESSURE: 91 MMHG | HEIGHT: 69 IN

## 2020-12-16 VITALS — TEMPERATURE: 96.3 F

## 2020-12-16 PROCEDURE — 99072 ADDL SUPL MATRL&STAF TM PHE: CPT

## 2020-12-16 PROCEDURE — 99214 OFFICE O/P EST MOD 30 MIN: CPT

## 2020-12-23 ENCOUNTER — APPOINTMENT (OUTPATIENT)
Dept: ORTHOPEDIC SURGERY | Facility: CLINIC | Age: 65
End: 2020-12-23
Payer: COMMERCIAL

## 2020-12-23 VITALS
BODY MASS INDEX: 25.48 KG/M2 | SYSTOLIC BLOOD PRESSURE: 129 MMHG | HEART RATE: 66 BPM | WEIGHT: 172 LBS | HEIGHT: 69 IN | DIASTOLIC BLOOD PRESSURE: 78 MMHG

## 2020-12-23 PROCEDURE — 73110 X-RAY EXAM OF WRIST: CPT | Mod: RT

## 2020-12-23 PROCEDURE — 99213 OFFICE O/P EST LOW 20 MIN: CPT

## 2020-12-23 PROCEDURE — 99072 ADDL SUPL MATRL&STAF TM PHE: CPT

## 2020-12-30 ENCOUNTER — APPOINTMENT (OUTPATIENT)
Dept: ORTHOPEDIC SURGERY | Facility: CLINIC | Age: 65
End: 2020-12-30

## 2021-01-13 ENCOUNTER — APPOINTMENT (OUTPATIENT)
Dept: ORTHOPEDIC SURGERY | Facility: CLINIC | Age: 66
End: 2021-01-13
Payer: COMMERCIAL

## 2021-01-13 PROCEDURE — 73110 X-RAY EXAM OF WRIST: CPT | Mod: RT

## 2021-01-13 PROCEDURE — 99213 OFFICE O/P EST LOW 20 MIN: CPT

## 2021-01-13 PROCEDURE — 99072 ADDL SUPL MATRL&STAF TM PHE: CPT

## 2021-01-13 NOTE — PHYSICAL EXAM
[de-identified] : GENERAL: Awake, alert, cooperative, answers questions appropriately. No acute distress.  Ambulates independently with a normal gait.\par SKIN: Warm, dry, intact. Color and turgor normal. \par LUNGS: Demonstrates unlabored breathing on room air with no accessory muscle use.\par EXTREMITIES: Warm and well-perfused. \par NEUROLOGICAL: Grossly intact.\par \par FOCUSED UPPER EXTREMITY EXAM:\par \par RUE in sugartong splint:\par -skin around the splint clean and dry\par -mild swelling along dorsal hand and fingers with ecchymosis improved compared with previous visit\par -normal finger cascade without malrotation\par -no tenderness to palpation along first dorsal wrist compartment, thumb extension intact\par -no tenderness to palpation at the base of the thumb\par -unable to make full fist, decreased AROM in all fingers due to swelling and blocked by splint, no scissoring\par -sensation intact in radial, ulnar, and median nerve distributions; slightly decreased over the ulnar aspect of the middle finger\par -Brisk capillary refill, fingers warm well perfused \par  [de-identified] : X-rays of right wrist (3 views) in the splint were obtained in Office today and reviewed with patient, showing more settling of the fracture now at ulnar positive about 1 mm (compared to ulnar negative 2 mm in post reduction XRs in ED) but still about 10 degrees volar tilt in sagittal plane; evidence of fracture healing with callus formation\par \par Previous XRs of right wrist (3 views) from ED visit (12/12/2020) before and after closed reduction were reviewed with patient, showing colle's fracture with multiple intraarticular fragments, anatomically reduced and splinted

## 2021-01-13 NOTE — REASON FOR VISIT
[Follow-Up Visit] : a follow-up visit for [Wrist Pain] : wrist pain [FreeTextEntry2] : Right wrist fracture follow up

## 2021-01-13 NOTE — DISCUSSION/SUMMARY
[FreeTextEntry1] : 65 year old female with right distal radius Colle's fracture (DOI 12/12/2020) now status post closed reduction and splinting in the emergency room; overall healing well\par \par -We discussed in detail the clinical and radiographic findings\par -I transitioned her into a fracture brace for full time wear except for hygiene for another 2 weeks, NWB in the right wrist\par -I encouraged her to move her fingers to help decrease swelling\par -She was advised to take over the counter medication for pain as needed if there is no contraindication. \par -She was advised to keep the UE elevated and use ice intermittently to help decrease swelling \par -I will see her in two weeks for repeat XRs of the wrist (3 views) out of the brace, sooner as needed\par -Patient had the opportunity to ask questions and was in agreement with the plan\par -Over 50% of the time spent with the patient was on counseling the patient on the above diagnosis, treatment plan and prognosis. \par

## 2021-01-13 NOTE — HISTORY OF PRESENT ILLNESS
[FreeTextEntry1] : 01/13/2021\par Ms. JULIO VIDAL returns for follow up of right distal radius fracture (DOI 12/12/2020).  She had to delay return to office by a few weeks due to quarantine for COVID.  She tolerated the splint well.  Reports mild pain in the wrist with activities.  She reports paresthesia along ulnar aspect of the middle finger unchanged from previous visit.\par \par 12/23/2020\par Ms. JULIO VIDAL returns for follow up of right distal radius fracture (DOI 12/12/2020).  She tolerated the splint well.  Reports mild pain in the wrist with activities.  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  She was seen at [facility] emergency room where imaging showed colle's fracture.  She underwent closed reduction and was placed in a sugartong splint and referred to our office for follow up. \par \par She denies prior injury.\par Currently her pain is intermittent, achy, sharp at times, without radiation.\par She has occasional paresthesia in middle finger \par She denies night symptoms.\par Symptoms improve with rest and immobilization.\par Symptoms worsen with activity.\par \par She is not diabetic.\par She denies history of thyroid disease.\par She denies current tobacco use.\par She denies recreational drug use.\par She does not take any narcotic pain medication. \par

## 2021-01-27 ENCOUNTER — APPOINTMENT (OUTPATIENT)
Dept: ORTHOPEDIC SURGERY | Facility: CLINIC | Age: 66
End: 2021-01-27
Payer: COMMERCIAL

## 2021-01-27 VITALS — BODY MASS INDEX: 25.92 KG/M2 | WEIGHT: 175 LBS | HEIGHT: 69 IN

## 2021-01-27 PROCEDURE — 73110 X-RAY EXAM OF WRIST: CPT | Mod: RT

## 2021-01-27 PROCEDURE — 99072 ADDL SUPL MATRL&STAF TM PHE: CPT

## 2021-01-27 PROCEDURE — 99213 OFFICE O/P EST LOW 20 MIN: CPT

## 2021-02-01 NOTE — HISTORY OF PRESENT ILLNESS
[FreeTextEntry1] : 01/27/2021\par Ms. JULIO VIDAL returns for follow up of right distal radius fracture (DOI 12/12/2020).  She tolerated the fracture brace well.  Reports mild pain in the wrist with activities.  She still has paresthesia along ulnar aspect of the middle finger unchanged from previous visit.\par \par 01/13/2021\par Ms. JULIO VIDAL returns for follow up of right distal radius fracture (DOI 12/12/2020).  She had to delay return to office by a few weeks due to quarantine for COVID.  She tolerated the splint well.  Reports mild pain in the wrist with activities.  She reports paresthesia along ulnar aspect of the middle finger unchanged from previous visit.\par \par 12/23/2020\par Ms. JULIO VIDAL returns for follow up of right distal radius fracture (DOI 12/12/2020).  She tolerated the splint well.  Reports mild pain in the wrist with activities.  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  She was seen at [facility] emergency room where imaging showed colle's fracture.  She underwent closed reduction and was placed in a sugartong splint and referred to our office for follow up. \par \par She denies prior injury.\par Currently her pain is intermittent, achy, sharp at times, without radiation.\par She has occasional paresthesia in middle finger \par She denies night symptoms.\par Symptoms improve with rest and immobilization.\par Symptoms worsen with activity.\par \par She is not diabetic.\par She denies history of thyroid disease.\par She denies current tobacco use.\par She denies recreational drug use.\par She does not take any narcotic pain medication. \par

## 2021-02-01 NOTE — DISCUSSION/SUMMARY
[FreeTextEntry1] : 65 year old female with right distal radius Colle's fracture (DOI 12/12/2020) now status post closed reduction and splinting in the emergency room; overall healing well\par \par -We discussed in detail the clinical and radiographic findings\par -She can transition to night time bracing and for strenuous activities for another two weeks then wean off\par -I referred her to therapy to work on ROM, pain control, edema control, modalities, home exercises.  WBAT after six weeks from the time of injury.  Strengthening can start after eight weeks from the time of injury.\par -She was advised to take over the counter medication for pain as needed if there is no contraindication. \par -She was advised to keep the UE elevated and use ice intermittently to help decrease swelling \par -I will see her in six weeks, sooner as needed\par -Patient had the opportunity to ask questions and was in agreement with the plan\par -Over 50% of the time spent with the patient was on counseling the patient on the above diagnosis, treatment plan and prognosis. \par

## 2021-02-01 NOTE — REASON FOR VISIT
[Initial Visit] : an initial visit for [Wrist Pain] : wrist pain [FreeTextEntry2] : Right wrist pain

## 2021-02-01 NOTE — PHYSICAL EXAM
[de-identified] : GENERAL: Awake, alert, cooperative, answers questions appropriately. No acute distress.  Ambulates independently with a normal gait.\par SKIN: Warm, dry, intact. Color and turgor normal. \par LUNGS: Demonstrates unlabored breathing on room air with no accessory muscle use.\par EXTREMITIES: Warm and well-perfused. \par NEUROLOGICAL: Grossly intact.\par \par FOCUSED UPPER EXTREMITY EXAM:\par \par RUE:\par -skin clean and dry\par -mild swelling along dorsal hand and fingers with ecchymosis improved compared with previous visit\par -subtle radial deviation of the wrist\par -normal finger cascade without malrotation\par -minimal tenderness to palpation over distal radius with subtle step off, no motion at the fracture site\par -no tenderness to palpation along first dorsal wrist compartment, thumb extension intact\par -no tenderness to palpation at the base of the thumb\par -able to make full fist, slightly decreased AROM in all fingers from stiffness, no scissoring\par -decreased wrist ROM from stiffness\par -DRUJ stable and non-tender\par -sensation intact in radial, ulnar, and median nerve distributions; slightly decreased over the ulnar aspect of the middle finger\par -Brisk capillary refill, fingers warm well perfused \par  [de-identified] : X-rays of right wrist (3 views) were obtained in Office today and reviewed with patient, showing slightly more settling of the fracture now at ulnar positive about 2.5 mm (compared to ulnar negative 2 mm in post reduction XRs in ED) but still about 10 degrees volar tilt in sagittal plane; evidence of fracture healing with callus formation\par \par Previous XRs of right wrist (3 views) from ED visit (12/12/2020) before and after closed reduction were reviewed with patient, showing Colle's fracture with multiple intraarticular fragments, anatomically reduced and splinted \par

## 2021-03-05 NOTE — ED ADULT NURSE REASSESSMENT NOTE - NS ED NURSE REASSESS COMMENT FT1
Pt feeling better,  physical therapist present, crutch-walking instructions given.  Pt ambulating well with crutches. Stable for discharge, out-pt ortho f/u. Pt feeling better,  physical therapist present, crutch-walking instructions given.  Pt ambulating well with crutches. Pt instructed to return to ED for increased swelling, severe pain ,  Stable for discharge, out-pt ortho f/u. show

## 2021-03-17 ENCOUNTER — APPOINTMENT (OUTPATIENT)
Dept: ORTHOPEDIC SURGERY | Facility: CLINIC | Age: 66
End: 2021-03-17
Payer: COMMERCIAL

## 2021-03-17 PROCEDURE — 99072 ADDL SUPL MATRL&STAF TM PHE: CPT

## 2021-03-17 PROCEDURE — 99213 OFFICE O/P EST LOW 20 MIN: CPT

## 2021-03-17 NOTE — DISCUSSION/SUMMARY
[FreeTextEntry1] : 66 year old female with right distal radius Colle's fracture (DOI 12/12/2020); overall healing well\par \par -We discussed in detail the clinical and radiographic findings\par -I renewed her therapy prescription\par -She was advised to take over the counter medication for pain as needed if there is no contraindication. \par -She was advised to keep the UE elevated and use ice intermittently to help decrease swelling \par -She can gradually return to regular activities without restrictions, WBAT\par -I will see her as needed.\par -Patient had the opportunity to ask questions and was in agreement with the plan\par -Over 50% of the time spent with the patient was on counseling the patient on the above diagnosis, treatment plan and prognosis. \par

## 2021-03-17 NOTE — HISTORY OF PRESENT ILLNESS
[FreeTextEntry1] : 03/17/2021\par Ms. JULIO VIDAL returns for follow up of right distal radius fracture (DOI 12/12/2020).  She made good progress with therapy and would like to continue.  Reports minimal pain in the wrist with activities.\par \par 01/27/2021\par Ms. JULIO VIDAL returns for follow up of right distal radius fracture (DOI 12/12/2020).  She tolerated the fracture brace well.  Reports mild pain in the wrist with activities.  She still has paresthesia along ulnar aspect of the middle finger unchanged from previous visit.\par \par 01/13/2021\par Ms. JULIO VIDAL returns for follow up of right distal radius fracture (DOI 12/12/2020).  She had to delay return to office by a few weeks due to quarantine for COVID.  She tolerated the splint well.  Reports mild pain in the wrist with activities.  She reports paresthesia along ulnar aspect of the middle finger unchanged from previous visit.\par \par 12/23/2020\par Ms. JULIO VIDAL returns for follow up of right distal radius fracture (DOI 12/12/2020).  She tolerated the splint well.  Reports mild pain in the wrist with activities.  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  She was seen at [facility] emergency room where imaging showed colle's fracture.  She underwent closed reduction and was placed in a sugartong splint and referred to our office for follow up. \par \par She denies prior injury.\par Currently her pain is intermittent, achy, sharp at times, without radiation.\par She has occasional paresthesia in middle finger \par She denies night symptoms.\par Symptoms improve with rest and immobilization.\par Symptoms worsen with activity.\par \par She is not diabetic.\par She denies history of thyroid disease.\par She denies current tobacco use.\par She denies recreational drug use.\par She does not take any narcotic pain medication. \par

## 2021-03-17 NOTE — REASON FOR VISIT
[Follow-Up Visit] : a follow-up visit for [Wrist Pain] : wrist pain [FreeTextEntry2] : Right wrist pain

## 2021-03-17 NOTE — PHYSICAL EXAM
[de-identified] : GENERAL: Awake, alert, cooperative, answers questions appropriately. No acute distress.  Ambulates independently with a normal gait.\par SKIN: Warm, dry, intact. Color and turgor normal. \par LUNGS: Demonstrates unlabored breathing on room air with no accessory muscle use.\par EXTREMITIES: Warm and well-perfused. \par NEUROLOGICAL: Grossly intact.\par \par FOCUSED UPPER EXTREMITY EXAM:\par \par RUE:\par -skin clean and dry\par -very mild swelling along dorsal hand and fingers with no ecchymosis improved compared with previous visit\par -subtle radial deviation of the wrist\par -normal finger cascade without malrotation\par -minimal tenderness to palpation over distal radius with subtle step off, no motion at the fracture site\par -no tenderness to palpation along first dorsal wrist compartment, thumb extension intact\par -no tenderness to palpation at the base of the thumb\par -able to make full fist, full AROM in all fingers, no scissoring\par -slightly decreased wrist ROM from stiffness\par -DRUJ stable and non-tender\par -sensation intact in radial, ulnar, and median nerve distributions\par -Brisk capillary refill, fingers warm well perfused \par  [de-identified] : No new XRs were taken during today's visit.\par \par Previous X-rays of right wrist (3 views) were obtained in Office, showing slightly more settling of the fracture now at ulnar positive about 2.5 mm (compared to ulnar negative 2 mm in post reduction XRs in ED) but still about 10 degrees volar tilt in sagittal plane; evidence of fracture healing with callus formation\par \par Previous XRs of right wrist (3 views) from ED visit (12/12/2020) before and after closed reduction were reviewed with patient, showing Colle's fracture with multiple intraarticular fragments, anatomically reduced and splinted \par

## 2021-04-10 ENCOUNTER — RESULT REVIEW (OUTPATIENT)
Age: 66
End: 2021-04-10

## 2021-04-13 NOTE — ED ADULT TRIAGE NOTE - HEART RATE (BEATS/MIN)
[de-identified] : Physical Exam:\par General: Well appearing, no acute distress, A&O\par Neurologic: A&Ox3, No focal deficits\par Head: NCAT without abrasions, lacerations, or ecchymosis to head, face, or scalp\par Respiratory: Equal chest wall expansion bilaterally, no accessory muscle use\par Lymphatic: No lymphadenopathy palpated\par Skin: Warm and dry\par Psychiatric: Normal mood and affect\par \par RLE:\par SILT s/s/sp/dp/t\par Fires EHL/FHL/GS/TA\par 2+ DP/PT pulse\par brisk capillary refill\par Positive bruising and tenderness to palpation around the distal fibula [de-identified] : Plain films of the right ankle obtained today show a nondisplaced transverse Giles a distal fibula fracture 79

## 2021-05-26 ENCOUNTER — APPOINTMENT (OUTPATIENT)
Dept: ORTHOPEDIC SURGERY | Facility: CLINIC | Age: 66
End: 2021-05-26
Payer: COMMERCIAL

## 2021-05-26 VITALS — HEIGHT: 69 IN | BODY MASS INDEX: 26.66 KG/M2 | WEIGHT: 180 LBS

## 2021-05-26 PROCEDURE — 99072 ADDL SUPL MATRL&STAF TM PHE: CPT

## 2021-05-26 PROCEDURE — 99213 OFFICE O/P EST LOW 20 MIN: CPT

## 2021-06-09 ENCOUNTER — OUTPATIENT (OUTPATIENT)
Dept: OUTPATIENT SERVICES | Facility: HOSPITAL | Age: 66
LOS: 1 days | End: 2021-06-09
Payer: COMMERCIAL

## 2021-06-09 ENCOUNTER — APPOINTMENT (OUTPATIENT)
Dept: MAMMOGRAPHY | Facility: CLINIC | Age: 66
End: 2021-06-09
Payer: COMMERCIAL

## 2021-06-09 ENCOUNTER — RESULT REVIEW (OUTPATIENT)
Age: 66
End: 2021-06-09

## 2021-06-09 DIAGNOSIS — Z00.8 ENCOUNTER FOR OTHER GENERAL EXAMINATION: ICD-10-CM

## 2021-06-09 DIAGNOSIS — Z12.31 ENCOUNTER FOR SCREENING MAMMOGRAM FOR MALIGNANT NEOPLASM OF BREAST: ICD-10-CM

## 2021-06-09 PROCEDURE — G0279: CPT

## 2021-06-09 PROCEDURE — 77066 DX MAMMO INCL CAD BI: CPT

## 2021-06-09 PROCEDURE — 77067 SCR MAMMO BI INCL CAD: CPT

## 2021-06-09 PROCEDURE — 77063 BREAST TOMOSYNTHESIS BI: CPT

## 2021-06-09 PROCEDURE — 77063 BREAST TOMOSYNTHESIS BI: CPT | Mod: 26

## 2021-06-09 PROCEDURE — 77067 SCR MAMMO BI INCL CAD: CPT | Mod: 26

## 2021-06-24 ENCOUNTER — NON-APPOINTMENT (OUTPATIENT)
Age: 66
End: 2021-06-24

## 2021-07-07 ENCOUNTER — APPOINTMENT (OUTPATIENT)
Dept: DERMATOLOGY | Facility: CLINIC | Age: 66
End: 2021-07-07
Payer: COMMERCIAL

## 2021-07-07 PROCEDURE — 99213 OFFICE O/P EST LOW 20 MIN: CPT

## 2021-07-07 PROCEDURE — 99072 ADDL SUPL MATRL&STAF TM PHE: CPT

## 2021-07-28 DIAGNOSIS — S52.531A COLLES' FRACTURE OF RIGHT RADIUS, INITIAL ENCOUNTER FOR CLOSED FRACTURE: ICD-10-CM

## 2021-08-04 ENCOUNTER — APPOINTMENT (OUTPATIENT)
Dept: ORTHOPEDIC SURGERY | Facility: CLINIC | Age: 66
End: 2021-08-04
Payer: COMMERCIAL

## 2021-08-04 DIAGNOSIS — M65.311 TRIGGER THUMB, RIGHT THUMB: ICD-10-CM

## 2021-08-04 DIAGNOSIS — M77.8 OTHER ENTHESOPATHIES, NOT ELSEWHERE CLASSIFIED: ICD-10-CM

## 2021-08-04 DIAGNOSIS — M65.4 RADIAL STYLOID TENOSYNOVITIS [DE QUERVAIN]: ICD-10-CM

## 2021-08-04 PROCEDURE — 99213 OFFICE O/P EST LOW 20 MIN: CPT

## 2022-06-22 NOTE — ED PROVIDER NOTE - PROGRESS NOTE DETAILS
PT saw patient, patient able to ambulate with walker, patient states she wants to go home and feels safe going home, she has assistance at home, will give pt ortho referral, send pain medication to patients pharamacy Double O-Z Flap Text: The defect edges were debeveled with a #15 scalpel blade.  Given the location of the defect, shape of the defect and the proximity to free margins a Double O-Z flap was deemed most appropriate.  Using a sterile surgical marker, an appropriate transposition flap was drawn incorporating the defect and placing the expected incisions within the relaxed skin tension lines where possible. The area thus outlined was incised deep to adipose tissue with a #15 scalpel blade.  The skin margins were undermined to an appropriate distance in all directions utilizing iris scissors.

## 2022-07-07 ENCOUNTER — APPOINTMENT (OUTPATIENT)
Dept: DERMATOLOGY | Facility: CLINIC | Age: 67
End: 2022-07-07

## 2022-07-07 PROCEDURE — 99213 OFFICE O/P EST LOW 20 MIN: CPT

## 2022-08-10 ENCOUNTER — APPOINTMENT (OUTPATIENT)
Dept: INTERNAL MEDICINE | Facility: CLINIC | Age: 67
End: 2022-08-10

## 2022-08-10 ENCOUNTER — NON-APPOINTMENT (OUTPATIENT)
Age: 67
End: 2022-08-10

## 2022-08-10 VITALS
SYSTOLIC BLOOD PRESSURE: 132 MMHG | HEIGHT: 69 IN | HEART RATE: 71 BPM | OXYGEN SATURATION: 98 % | DIASTOLIC BLOOD PRESSURE: 80 MMHG | WEIGHT: 175 LBS | BODY MASS INDEX: 25.92 KG/M2

## 2022-08-10 DIAGNOSIS — R07.89 OTHER CHEST PAIN: ICD-10-CM

## 2022-08-10 PROCEDURE — 36415 COLL VENOUS BLD VENIPUNCTURE: CPT

## 2022-08-10 PROCEDURE — 93000 ELECTROCARDIOGRAM COMPLETE: CPT

## 2022-08-10 PROCEDURE — 99397 PER PM REEVAL EST PAT 65+ YR: CPT | Mod: 25

## 2022-08-10 RX ORDER — ZOLPIDEM TARTRATE 5 MG/1
5 TABLET ORAL
Qty: 30 | Refills: 0 | Status: ACTIVE | COMMUNITY
Start: 2018-11-16 | End: 1900-01-01

## 2022-08-10 RX ORDER — OXYCODONE 5 MG/1
5 TABLET ORAL
Qty: 12 | Refills: 0 | Status: DISCONTINUED | COMMUNITY
Start: 2020-12-23 | End: 2022-08-10

## 2022-08-10 RX ORDER — DICLOFENAC SODIUM 1% 10 MG/G
1 GEL TOPICAL
Qty: 1 | Refills: 2 | Status: DISCONTINUED | COMMUNITY
Start: 2021-05-26 | End: 2022-08-10

## 2022-08-10 RX ORDER — HYDROXYZINE HYDROCHLORIDE 25 MG/1
25 TABLET ORAL
Qty: 15 | Refills: 0 | Status: DISCONTINUED | COMMUNITY
Start: 2019-04-03 | End: 2022-08-10

## 2022-08-10 RX ORDER — DICLOFENAC SODIUM 75 MG/1
75 TABLET, DELAYED RELEASE ORAL
Qty: 30 | Refills: 0 | Status: DISCONTINUED | COMMUNITY
Start: 2019-05-23 | End: 2022-08-10

## 2022-08-10 RX ORDER — METHYLPREDNISOLONE 4 MG/1
4 TABLET ORAL
Qty: 1 | Refills: 0 | Status: DISCONTINUED | COMMUNITY
Start: 2021-01-27 | End: 2022-08-10

## 2022-08-10 RX ORDER — FLUOCINONIDE 0.5 MG/G
0.05 CREAM TOPICAL TWICE DAILY
Qty: 1 | Refills: 0 | Status: DISCONTINUED | COMMUNITY
Start: 2019-04-03 | End: 2022-08-10

## 2022-08-10 NOTE — PLAN
2/5/20 Patient: Kashif Shah YOB: 1978 Date of Visit: 2/5/2020 Karlos Covington NP 
[address] [place] [id number] VIA Facsimile: [phone] Dear Karlos Covington NP, Thank you for referring Ms. Kashif Shah to [address] [place] [address] for evaluation. My notes for this consultation are attached. If you have questions, please do not hesitate to call me. I look forward to following your patient along with you. Sincerely, Ally Monzon MD 
 

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

## 2022-08-10 NOTE — HISTORY OF PRESENT ILLNESS
[FreeTextEntry1] : ANNUAL EXAM  [de-identified] :  68 YO FEMALE HERE FOR ANNUAL EXAM PT WORKS AS   at [employer] and overall feels  HAS GAINED WEIGHT NO CP NOS OSB HAS INSOMINIA USES AMBIEN SPARINGLY NOT ASKED FOR RX IN 2 YEARS\par

## 2022-08-14 LAB
25(OH)D3 SERPL-MCNC: 31.7 NG/ML
ALBUMIN SERPL ELPH-MCNC: 4.7 G/DL
ALP BLD-CCNC: 59 U/L
ALT SERPL-CCNC: 24 U/L
ANION GAP SERPL CALC-SCNC: 12 MMOL/L
AST SERPL-CCNC: 27 U/L
BASOPHILS # BLD AUTO: 0.05 K/UL
BASOPHILS NFR BLD AUTO: 0.9 %
BILIRUB SERPL-MCNC: 0.7 MG/DL
BUN SERPL-MCNC: 15 MG/DL
CALCIUM SERPL-MCNC: 10.6 MG/DL
CHLORIDE SERPL-SCNC: 101 MMOL/L
CHOLEST SERPL-MCNC: 232 MG/DL
CO2 SERPL-SCNC: 25 MMOL/L
CREAT SERPL-MCNC: 0.79 MG/DL
EGFR: 82 ML/MIN/1.73M2
EOSINOPHIL # BLD AUTO: 0.3 K/UL
EOSINOPHIL NFR BLD AUTO: 5.3 %
ESTIMATED AVERAGE GLUCOSE: 108 MG/DL
GLUCOSE SERPL-MCNC: 92 MG/DL
HBA1C MFR BLD HPLC: 5.4 %
HCT VFR BLD CALC: 46.1 %
HDLC SERPL-MCNC: 80 MG/DL
HGB BLD-MCNC: 14.5 G/DL
IMM GRANULOCYTES NFR BLD AUTO: 0.2 %
LDLC SERPL CALC-MCNC: 134 MG/DL
LYMPHOCYTES # BLD AUTO: 1.61 K/UL
LYMPHOCYTES NFR BLD AUTO: 28.4 %
MAN DIFF?: NORMAL
MCHC RBC-ENTMCNC: 29.8 PG
MCHC RBC-ENTMCNC: 31.5 GM/DL
MCV RBC AUTO: 94.7 FL
MONOCYTES # BLD AUTO: 0.34 K/UL
MONOCYTES NFR BLD AUTO: 6 %
NEUTROPHILS # BLD AUTO: 3.36 K/UL
NEUTROPHILS NFR BLD AUTO: 59.2 %
NONHDLC SERPL-MCNC: 152 MG/DL
PLATELET # BLD AUTO: 277 K/UL
POTASSIUM SERPL-SCNC: 4.3 MMOL/L
PROT SERPL-MCNC: 6.7 G/DL
PSA SERPL-MCNC: <0.01 NG/ML
RBC # BLD: 4.87 M/UL
RBC # FLD: 13.7 %
SODIUM SERPL-SCNC: 138 MMOL/L
T4 SERPL-MCNC: 6.7 UG/DL
TRIGL SERPL-MCNC: 87 MG/DL
TSH SERPL-ACNC: 1.63 UIU/ML
WBC # FLD AUTO: 5.67 K/UL

## 2022-08-15 ENCOUNTER — NON-APPOINTMENT (OUTPATIENT)
Age: 67
End: 2022-08-15

## 2022-10-16 RX ORDER — ALPRAZOLAM 0.5 MG/1
0.5 TABLET ORAL DAILY
Qty: 20 | Refills: 0 | Status: ACTIVE | COMMUNITY
Start: 2022-10-16 | End: 1900-01-01

## 2022-11-02 NOTE — ED ADULT TRIAGE NOTE - CHIEF COMPLAINT QUOTE
Pt. complaining of right wrist pain s/p mechanical trip and fall on stoop earlier today.  Obvious deformity to right wrist.
5

## 2023-01-12 NOTE — ED PROVIDER NOTE - NS ED MD DISPO DISCHARGE
Pt admitted to 323, oriented to room and unit routine, infant safety discussed, admission papers reviewed, encouraged po fluids, pcds on, pt comfortable Home

## 2023-03-30 ENCOUNTER — RESULT REVIEW (OUTPATIENT)
Age: 68
End: 2023-03-30

## 2023-03-30 ENCOUNTER — OUTPATIENT (OUTPATIENT)
Dept: OUTPATIENT SERVICES | Facility: HOSPITAL | Age: 68
LOS: 1 days | End: 2023-03-30
Payer: MEDICARE

## 2023-03-30 ENCOUNTER — APPOINTMENT (OUTPATIENT)
Dept: MAMMOGRAPHY | Facility: CLINIC | Age: 68
End: 2023-03-30
Payer: MEDICARE

## 2023-03-30 ENCOUNTER — APPOINTMENT (OUTPATIENT)
Dept: DERMATOLOGY | Facility: CLINIC | Age: 68
End: 2023-03-30
Payer: MEDICARE

## 2023-03-30 DIAGNOSIS — Z00.8 ENCOUNTER FOR OTHER GENERAL EXAMINATION: ICD-10-CM

## 2023-03-30 PROCEDURE — 77067 SCR MAMMO BI INCL CAD: CPT

## 2023-03-30 PROCEDURE — 99212 OFFICE O/P EST SF 10 MIN: CPT

## 2023-03-30 PROCEDURE — 77063 BREAST TOMOSYNTHESIS BI: CPT

## 2023-03-30 PROCEDURE — 77063 BREAST TOMOSYNTHESIS BI: CPT | Mod: 26

## 2023-03-30 PROCEDURE — 77067 SCR MAMMO BI INCL CAD: CPT | Mod: 26

## 2023-05-17 NOTE — ED ADULT NURSE NOTE - BREATH SOUNDS, MLM
Thank you for your confidence in our team    We appreciate you and welcome your feedback  If you receive a survey from us, please take a few moments to let us know how we are doing     Sincerely,  Gabriela Torres Clear

## 2023-06-30 ENCOUNTER — LABORATORY RESULT (OUTPATIENT)
Age: 68
End: 2023-06-30

## 2023-07-05 ENCOUNTER — NON-APPOINTMENT (OUTPATIENT)
Age: 68
End: 2023-07-05

## 2023-07-10 ENCOUNTER — APPOINTMENT (OUTPATIENT)
Dept: DERMATOLOGY | Facility: CLINIC | Age: 68
End: 2023-07-10
Payer: MEDICARE

## 2023-07-10 PROCEDURE — 99213 OFFICE O/P EST LOW 20 MIN: CPT

## 2023-07-12 ENCOUNTER — OFFICE (OUTPATIENT)
Dept: URBAN - METROPOLITAN AREA CLINIC 115 | Facility: CLINIC | Age: 68
Setting detail: OPHTHALMOLOGY
End: 2023-07-12
Payer: MEDICARE

## 2023-07-12 DIAGNOSIS — H25.13: ICD-10-CM

## 2023-07-12 DIAGNOSIS — H52.4: ICD-10-CM

## 2023-07-12 DIAGNOSIS — H18.413: ICD-10-CM

## 2023-07-12 DIAGNOSIS — H16.223: ICD-10-CM

## 2023-07-12 PROBLEM — H52.7 REFRACTIVE ERROR: Status: ACTIVE | Noted: 2023-07-12

## 2023-07-12 PROCEDURE — 92002 INTRM OPH EXAM NEW PATIENT: CPT | Performed by: OPTOMETRIST

## 2023-07-12 PROCEDURE — 92015 DETERMINE REFRACTIVE STATE: CPT | Performed by: OPTOMETRIST

## 2023-07-12 ASSESSMENT — REFRACTION_CURRENTRX
OS_VPRISM_DIRECTION: PROGS
OD_CYLINDER: 0.00
OD_VPRISM_DIRECTION: PROGS
OS_ADD: +1.50
OD_OVR_VA: 20/
OS_SPHERE: +1.00
OD_SPHERE: +1.25
OS_OVR_VA: 20/
OD_ADD: +1.75
OD_AXIS: 180
OS_CYLINDER: 0.00
OS_AXIS: 180

## 2023-07-12 ASSESSMENT — REFRACTION_MANIFEST
OD_ADD: +2.50
OD_VA1: 20/20
OD_AXIS: 090
OD_VA1: 20/20-
OD_ADD: +2.75
OS_AXIS: 104
OD_SPHERE: +2.50
OS_AXIS: 095
OS_ADD: +2.50
OS_ADD: +2.75
OD_AXIS: 081
OD_CYLINDER: -0.50
OS_SPHERE: +1.75
OS_VA1: 20/20
OD_CYLINDER: -0.75
OD_SPHERE: +2.00
OS_CYLINDER: -0.50
OS_VA1: 20/20-
OS_SPHERE: +1.75
OS_CYLINDER: -0.50

## 2023-07-12 ASSESSMENT — REFRACTION_AUTOREFRACTION
OD_SPHERE: +2.50
OS_CYLINDER: -0.50
OS_AXIS: 104
OS_SPHERE: +1.75
OD_CYLINDER: -0.50
OD_AXIS: 081

## 2023-07-12 ASSESSMENT — CONFRONTATIONAL VISUAL FIELD TEST (CVF)
OS_FINDINGS: FULL
OD_FINDINGS: FULL

## 2023-07-12 ASSESSMENT — SPHEQUIV_DERIVED
OS_SPHEQUIV: 1.5
OD_SPHEQUIV: 2.25
OD_SPHEQUIV: 1.625
OS_SPHEQUIV: 1.5
OS_SPHEQUIV: 1.5
OD_SPHEQUIV: 2.25

## 2023-07-12 ASSESSMENT — TONOMETRY
OD_IOP_MMHG: 18
OS_IOP_MMHG: 17

## 2023-07-12 ASSESSMENT — VISUAL ACUITY
OD_BCVA: 20/20-1
OS_BCVA: 20/20

## 2023-07-12 ASSESSMENT — SUPERFICIAL PUNCTATE KERATITIS (SPK)
OD_SPK: 1+ 2+
OS_SPK: 1+ 2+

## 2023-09-28 ENCOUNTER — APPOINTMENT (OUTPATIENT)
Dept: INTERNAL MEDICINE | Facility: CLINIC | Age: 68
End: 2023-09-28
Payer: MEDICARE

## 2023-09-28 VITALS
BODY MASS INDEX: 25.48 KG/M2 | WEIGHT: 172 LBS | HEIGHT: 69 IN | SYSTOLIC BLOOD PRESSURE: 145 MMHG | DIASTOLIC BLOOD PRESSURE: 80 MMHG

## 2023-09-28 DIAGNOSIS — M81.0 AGE-RELATED OSTEOPOROSIS W/OUT CURRENT PATHOLOGICAL FRACTURE: ICD-10-CM

## 2023-09-28 DIAGNOSIS — Z13.29 ENCOUNTER FOR SCREENING FOR OTHER SUSPECTED ENDOCRINE DISORDER: ICD-10-CM

## 2023-09-28 DIAGNOSIS — F41.9 ANXIETY DISORDER, UNSPECIFIED: ICD-10-CM

## 2023-09-28 PROCEDURE — 36415 COLL VENOUS BLD VENIPUNCTURE: CPT

## 2023-09-28 PROCEDURE — 99397 PER PM REEVAL EST PAT 65+ YR: CPT | Mod: 25

## 2023-09-28 RX ORDER — ZOLPIDEM TARTRATE 10 MG/1
10 TABLET ORAL
Qty: 30 | Refills: 0 | Status: ACTIVE | COMMUNITY
Start: 2023-09-28 | End: 1900-01-01

## 2023-10-10 LAB
25(OH)D3 SERPL-MCNC: 37.8 NG/ML
ALBUMIN SERPL ELPH-MCNC: 4.6 G/DL
ALP BLD-CCNC: 57 U/L
ALT SERPL-CCNC: 16 U/L
ANION GAP SERPL CALC-SCNC: 12 MMOL/L
AST SERPL-CCNC: 24 U/L
BASOPHILS # BLD AUTO: 0.05 K/UL
BASOPHILS NFR BLD AUTO: 1 %
BILIRUB SERPL-MCNC: 0.4 MG/DL
BUN SERPL-MCNC: 12 MG/DL
CALCIUM SERPL-MCNC: 9.6 MG/DL
CHLORIDE SERPL-SCNC: 104 MMOL/L
CHOLEST SERPL-MCNC: 237 MG/DL
CO2 SERPL-SCNC: 23 MMOL/L
CREAT SERPL-MCNC: 0.79 MG/DL
EGFR: 81 ML/MIN/1.73M2
EOSINOPHIL # BLD AUTO: 0.23 K/UL
EOSINOPHIL NFR BLD AUTO: 4.5 %
ESTIMATED AVERAGE GLUCOSE: 100 MG/DL
GLUCOSE SERPL-MCNC: 98 MG/DL
HBA1C MFR BLD HPLC: 5.1 %
HCT VFR BLD CALC: 45 %
HDLC SERPL-MCNC: 86 MG/DL
HGB BLD-MCNC: 14.4 G/DL
IMM GRANULOCYTES NFR BLD AUTO: 0.2 %
LDLC SERPL CALC-MCNC: 139 MG/DL
LYMPHOCYTES # BLD AUTO: 1.49 K/UL
LYMPHOCYTES NFR BLD AUTO: 29 %
MAN DIFF?: NORMAL
MCHC RBC-ENTMCNC: 30.4 PG
MCHC RBC-ENTMCNC: 32 GM/DL
MCV RBC AUTO: 95.1 FL
MONOCYTES # BLD AUTO: 0.37 K/UL
MONOCYTES NFR BLD AUTO: 7.2 %
NEUTROPHILS # BLD AUTO: 2.98 K/UL
NEUTROPHILS NFR BLD AUTO: 58.1 %
NONHDLC SERPL-MCNC: 150 MG/DL
PLATELET # BLD AUTO: 245 K/UL
POTASSIUM SERPL-SCNC: 4.4 MMOL/L
PROT SERPL-MCNC: 6.4 G/DL
RBC # BLD: 4.73 M/UL
RBC # FLD: 13.5 %
SODIUM SERPL-SCNC: 139 MMOL/L
T4 SERPL-MCNC: 6.6 UG/DL
TRIGL SERPL-MCNC: 67 MG/DL
TSH SERPL-ACNC: 1.63 UIU/ML
WBC # FLD AUTO: 5.13 K/UL

## 2023-10-24 ENCOUNTER — APPOINTMENT (OUTPATIENT)
Dept: INTERNAL MEDICINE | Facility: CLINIC | Age: 68
End: 2023-10-24

## 2023-10-25 ENCOUNTER — MED ADMIN CHARGE (OUTPATIENT)
Age: 68
End: 2023-10-25

## 2023-10-25 ENCOUNTER — APPOINTMENT (OUTPATIENT)
Dept: INTERNAL MEDICINE | Facility: CLINIC | Age: 68
End: 2023-10-25
Payer: MEDICARE

## 2023-10-25 PROCEDURE — G0008: CPT

## 2023-10-25 PROCEDURE — 90662 IIV NO PRSV INCREASED AG IM: CPT

## 2024-01-11 ENCOUNTER — OFFICE (OUTPATIENT)
Dept: URBAN - METROPOLITAN AREA CLINIC 115 | Facility: CLINIC | Age: 69
Setting detail: OPHTHALMOLOGY
End: 2024-01-11
Payer: MEDICARE

## 2024-01-11 DIAGNOSIS — H18.413: ICD-10-CM

## 2024-01-11 DIAGNOSIS — H35.40: ICD-10-CM

## 2024-01-11 DIAGNOSIS — H16.223: ICD-10-CM

## 2024-01-11 DIAGNOSIS — H25.13: ICD-10-CM

## 2024-01-11 DIAGNOSIS — H11.153: ICD-10-CM

## 2024-01-11 PROCEDURE — 92250 FUNDUS PHOTOGRAPHY W/I&R: CPT | Performed by: OPTOMETRIST

## 2024-01-11 PROCEDURE — 92014 COMPRE OPH EXAM EST PT 1/>: CPT | Performed by: OPTOMETRIST

## 2024-01-11 ASSESSMENT — REFRACTION_MANIFEST
OS_ADD: +2.50
OD_ADD: +2.75
OS_CYLINDER: -0.50
OD_VA1: 20/20-
OD_ADD: +2.50
OD_SPHERE: +2.50
OD_CYLINDER: -0.50
OS_CYLINDER: -0.50
OS_AXIS: 104
OD_SPHERE: +2.00
OS_ADD: +2.75
OD_CYLINDER: -0.75
OS_SPHERE: +1.75
OD_VA1: 20/20
OD_AXIS: 090
OS_VA1: 20/20-
OD_AXIS: 081
OS_AXIS: 095
OS_SPHERE: +1.75
OS_VA1: 20/20

## 2024-01-11 ASSESSMENT — REFRACTION_CURRENTRX
OS_SPHERE: +1.75
OD_OVR_VA: 20/
OD_VPRISM_DIRECTION: PROGS
OS_OVR_VA: 20/
OS_AXIS: 100
OS_VPRISM_DIRECTION: PROGS
OS_CYLINDER: -0.50
OD_AXIS: 081
OD_ADD: +2.75
OD_CYLINDER: -0.50
OD_SPHERE: +2.25
OS_ADD: +2.75

## 2024-01-11 ASSESSMENT — REFRACTION_AUTOREFRACTION
OS_CYLINDER: -0.25
OD_AXIS: 077
OS_AXIS: 112
OS_SPHERE: +1.75
OD_CYLINDER: -0.50
OD_SPHERE: +2.50

## 2024-01-11 ASSESSMENT — SUPERFICIAL PUNCTATE KERATITIS (SPK)
OD_SPK: 1+ 2+
OS_SPK: 1+ 2+

## 2024-01-11 ASSESSMENT — SPHEQUIV_DERIVED
OS_SPHEQUIV: 1.5
OD_SPHEQUIV: 1.625
OD_SPHEQUIV: 2.25
OS_SPHEQUIV: 1.625
OD_SPHEQUIV: 2.25
OS_SPHEQUIV: 1.5

## 2024-01-11 ASSESSMENT — CONFRONTATIONAL VISUAL FIELD TEST (CVF)
OS_FINDINGS: FULL
OD_FINDINGS: FULL

## 2024-05-01 ENCOUNTER — APPOINTMENT (OUTPATIENT)
Dept: INTERNAL MEDICINE | Facility: CLINIC | Age: 69
End: 2024-05-01
Payer: MEDICARE

## 2024-05-01 VITALS
HEART RATE: 71 BPM | RESPIRATION RATE: 16 BRPM | SYSTOLIC BLOOD PRESSURE: 132 MMHG | HEIGHT: 69 IN | WEIGHT: 152 LBS | BODY MASS INDEX: 22.51 KG/M2 | DIASTOLIC BLOOD PRESSURE: 87 MMHG

## 2024-05-01 DIAGNOSIS — Z02.1 ENCOUNTER FOR PRE-EMPLOYMENT EXAMINATION: ICD-10-CM

## 2024-05-01 DIAGNOSIS — E78.2 MIXED HYPERLIPIDEMIA: ICD-10-CM

## 2024-05-01 DIAGNOSIS — Z11.1 ENCOUNTER FOR SCREENING FOR RESPIRATORY TUBERCULOSIS: ICD-10-CM

## 2024-05-01 PROCEDURE — 99214 OFFICE O/P EST MOD 30 MIN: CPT

## 2024-05-01 PROCEDURE — 36415 COLL VENOUS BLD VENIPUNCTURE: CPT

## 2024-05-01 NOTE — HISTORY OF PRESENT ILLNESS
[FreeTextEntry1] : FOLLOW UP ON LIPID [de-identified] :  70 YO FEMALE HERE FOR FOLLOWUP  EXAM PT USED TO  WORK  AS   at [employer]  BUT WAS  LET GO LTE 2022 AND WA IS WORKING [employer] [facility]  AS WELL AS AT [place] AND [employer] OVERALL DOING WELL HAS SOME PAPERWORK FOR  EMPLOYMENT

## 2024-05-01 NOTE — PLAN
n/a
[FreeTextEntry1] : 68 YO FEMALE HERE FOR FOLLOWUP  EXAM PT USED TO  WORK  AS   at [employer]  BUT WAS  LET GO LTE 2022 AND WA IS WORKING [employer]  AS WELL AS AT [place] AND [employer] [facility] [employer] OVERALL DOING WELL HAS SOME PAPERWORK FOR  EMPLOYMENT FILLED OUT FORMS  NEEDS QUANTEFERON GOLD TB ASSAY FOR ONE OF THE JOBS WILL CHECK QUANTEFERON WILL FOLOWUP  HAS LOST WEIGTH MORE ACTIVE  WILL EVENTUALLY NEED KNEE SURGERY

## 2024-05-08 LAB
CHOLEST SERPL-MCNC: 241 MG/DL
ESTIMATED AVERAGE GLUCOSE: 111 MG/DL
HBA1C MFR BLD HPLC: 5.5 %
HDLC SERPL-MCNC: 85 MG/DL
LDLC SERPL CALC-MCNC: 146 MG/DL
M TB IFN-G BLD-IMP: NEGATIVE
NONHDLC SERPL-MCNC: 156 MG/DL
QUANTIFERON TB PLUS MITOGEN MINUS NIL: 3.28 IU/ML
QUANTIFERON TB PLUS NIL: 0.01 IU/ML
QUANTIFERON TB PLUS TB1 MINUS NIL: 0 IU/ML
QUANTIFERON TB PLUS TB2 MINUS NIL: 0 IU/ML
TRIGL SERPL-MCNC: 60 MG/DL

## 2024-05-09 DIAGNOSIS — E78.5 HYPERLIPIDEMIA, UNSPECIFIED: ICD-10-CM

## 2024-05-09 DIAGNOSIS — R73.03 PREDIABETES.: ICD-10-CM

## 2024-05-09 DIAGNOSIS — Z13.1 ENCOUNTER FOR SCREENING FOR DIABETES MELLITUS: ICD-10-CM

## 2024-05-09 DIAGNOSIS — Z13.220 ENCOUNTER FOR SCREENING FOR LIPOID DISORDERS: ICD-10-CM

## 2024-05-09 DIAGNOSIS — Z13.29 ENCOUNTER FOR SCREENING FOR OTHER SUSPECTED ENDOCRINE DISORDER: ICD-10-CM

## 2024-05-09 DIAGNOSIS — Z00.00 ENCOUNTER FOR GENERAL ADULT MEDICAL EXAMINATION W/OUT ABNORMAL FINDINGS: ICD-10-CM

## 2024-05-09 DIAGNOSIS — E55.9 VITAMIN D DEFICIENCY, UNSPECIFIED: ICD-10-CM

## 2024-05-09 DIAGNOSIS — R53.83 OTHER FATIGUE: ICD-10-CM

## 2024-05-09 RX ORDER — ROSUVASTATIN CALCIUM 5 MG/1
5 TABLET, FILM COATED ORAL DAILY
Qty: 90 | Refills: 1 | Status: ACTIVE | COMMUNITY
Start: 2024-05-09 | End: 1900-01-01

## 2024-07-17 NOTE — ED PROVIDER NOTE - NSCAREINITIATED _GEN_ER
Meaghan Valentine)
Detail Level: Detailed
General Sunscreen Counseling: I recommended a broad spectrum sunscreen with a SPF of 30 or higher. Sunscreens should be applied at least 15 minutes prior to expected sun exposure and then every 2 hours after that as long as sun exposure continues. If swimming or exercising, consider applying every 45 minutes to an hour after getting wet or sweating.   I also recommended sun protective clothing and shade when possible.

## 2024-07-18 ENCOUNTER — APPOINTMENT (OUTPATIENT)
Dept: DERMATOLOGY | Facility: CLINIC | Age: 69
End: 2024-07-18
Payer: MEDICARE

## 2024-07-18 PROCEDURE — 99213 OFFICE O/P EST LOW 20 MIN: CPT

## 2024-08-12 ENCOUNTER — RESULT REVIEW (OUTPATIENT)
Age: 69
End: 2024-08-12

## 2024-08-12 ENCOUNTER — APPOINTMENT (OUTPATIENT)
Dept: MAMMOGRAPHY | Facility: CLINIC | Age: 69
End: 2024-08-12
Payer: MEDICARE

## 2024-08-12 PROCEDURE — 77063 BREAST TOMOSYNTHESIS BI: CPT | Mod: 26

## 2024-08-12 PROCEDURE — 77067 SCR MAMMO BI INCL CAD: CPT | Mod: 26

## 2025-01-30 ENCOUNTER — OFFICE (OUTPATIENT)
Dept: URBAN - METROPOLITAN AREA CLINIC 115 | Facility: CLINIC | Age: 70
Setting detail: OPHTHALMOLOGY
End: 2025-01-30
Payer: MEDICARE

## 2025-01-30 DIAGNOSIS — H11.153: ICD-10-CM

## 2025-01-30 DIAGNOSIS — H25.13: ICD-10-CM

## 2025-01-30 DIAGNOSIS — H52.4: ICD-10-CM

## 2025-01-30 DIAGNOSIS — H52.7: ICD-10-CM

## 2025-01-30 DIAGNOSIS — H35.40: ICD-10-CM

## 2025-01-30 PROCEDURE — 92014 COMPRE OPH EXAM EST PT 1/>: CPT | Performed by: OPTOMETRIST

## 2025-01-30 PROCEDURE — 92015 DETERMINE REFRACTIVE STATE: CPT | Performed by: OPTOMETRIST

## 2025-01-30 ASSESSMENT — REFRACTION_CURRENTRX
OD_CYLINDER: -0.50
OS_AXIS: 091
OD_SPHERE: +2.75
OD_ADD: +2.25
OD_OVR_VA: 20/
OS_AXIS: 100
OD_VPRISM_DIRECTION: PROGS
OD_CYLINDER: -0.50
OD_SPHERE: +2.25
OS_VPRISM_DIRECTION: PROGS
OS_SPHERE: +1.75
OS_ADD: +2.75
OD_AXIS: 068
OS_ADD: +2.50
OD_ADD: +2.75
OS_OVR_VA: 20/
OS_SPHERE: +1.75
OD_OVR_VA: 20/
OS_OVR_VA: 20/
OS_CYLINDER: -0.50
OS_VPRISM_DIRECTION: PROGS
OD_VPRISM_DIRECTION: PROGS
OD_AXIS: 081
OS_CYLINDER: -0.50

## 2025-01-30 ASSESSMENT — REFRACTION_AUTOREFRACTION
OD_SPHERE: +2.50
OD_AXIS: 082
OS_AXIS: 108
OS_SPHERE: +2.25
OD_CYLINDER: -0.75
OS_CYLINDER: -0.50

## 2025-01-30 ASSESSMENT — REFRACTION_MANIFEST
OS_VA1: 20/20
OD_SPHERE: +2.50
OD_CYLINDER: -0.75
OS_AXIS: 108
OD_SPHERE: +2.25
OD_AXIS: 082
OD_VA1: 20/20
OS_VA1: 20/20
OS_CYLINDER: -0.50
OS_ADD: +2.50
OD_CYLINDER: -0.50
OS_SPHERE: +2.00
OS_AXIS: 104
OS_ADD: +2.75
OS_CYLINDER: -0.50
OD_AXIS: 081
OD_ADD: +2.50
OD_VA1: 20/20
OD_ADD: +2.75
OS_SPHERE: +1.75

## 2025-01-30 ASSESSMENT — SUPERFICIAL PUNCTATE KERATITIS (SPK)
OS_SPK: 1+ 2+
OD_SPK: 1+ 2+

## 2025-01-30 ASSESSMENT — TONOMETRY
OS_IOP_MMHG: 19
OD_IOP_MMHG: 17

## 2025-01-30 ASSESSMENT — CONFRONTATIONAL VISUAL FIELD TEST (CVF)
OD_FINDINGS: FULL
OS_FINDINGS: FULL

## 2025-01-30 ASSESSMENT — VISUAL ACUITY
OS_BCVA: 20/20
OD_BCVA: 20/20

## 2025-05-06 ENCOUNTER — NON-APPOINTMENT (OUTPATIENT)
Age: 70
End: 2025-05-06

## 2025-05-08 ENCOUNTER — APPOINTMENT (OUTPATIENT)
Dept: INTERNAL MEDICINE | Facility: CLINIC | Age: 70
End: 2025-05-08

## 2025-05-08 VITALS
HEIGHT: 69 IN | RESPIRATION RATE: 16 BRPM | BODY MASS INDEX: 23.4 KG/M2 | HEART RATE: 87 BPM | SYSTOLIC BLOOD PRESSURE: 108 MMHG | DIASTOLIC BLOOD PRESSURE: 92 MMHG | WEIGHT: 158 LBS

## 2025-05-08 DIAGNOSIS — E55.9 VITAMIN D DEFICIENCY, UNSPECIFIED: ICD-10-CM

## 2025-05-08 DIAGNOSIS — Z13.1 ENCOUNTER FOR SCREENING FOR DIABETES MELLITUS: ICD-10-CM

## 2025-05-08 DIAGNOSIS — F41.9 ANXIETY DISORDER, UNSPECIFIED: ICD-10-CM

## 2025-05-08 DIAGNOSIS — Z13.29 ENCOUNTER FOR SCREENING FOR OTHER SUSPECTED ENDOCRINE DISORDER: ICD-10-CM

## 2025-05-08 DIAGNOSIS — Z11.1 ENCOUNTER FOR SCREENING FOR RESPIRATORY TUBERCULOSIS: ICD-10-CM

## 2025-05-08 PROCEDURE — 99387 INIT PM E/M NEW PAT 65+ YRS: CPT | Mod: GY

## 2025-05-08 PROCEDURE — 36415 COLL VENOUS BLD VENIPUNCTURE: CPT

## 2025-05-11 LAB
25(OH)D3 SERPL-MCNC: 43.9 NG/ML
ALBUMIN SERPL ELPH-MCNC: 4.8 G/DL
ALP BLD-CCNC: 55 U/L
ALT SERPL-CCNC: 32 U/L
ANION GAP SERPL CALC-SCNC: 15 MMOL/L
AST SERPL-CCNC: 31 U/L
BASOPHILS # BLD AUTO: 0.05 K/UL
BASOPHILS NFR BLD AUTO: 0.9 %
BILIRUB SERPL-MCNC: 0.4 MG/DL
BUN SERPL-MCNC: 17 MG/DL
CALCIUM SERPL-MCNC: 9.8 MG/DL
CHLORIDE SERPL-SCNC: 104 MMOL/L
CHOLEST SERPL-MCNC: 237 MG/DL
CO2 SERPL-SCNC: 22 MMOL/L
CREAT SERPL-MCNC: 0.74 MG/DL
EGFRCR SERPLBLD CKD-EPI 2021: 87 ML/MIN/1.73M2
EOSINOPHIL # BLD AUTO: 0.17 K/UL
EOSINOPHIL NFR BLD AUTO: 3 %
ESTIMATED AVERAGE GLUCOSE: 105 MG/DL
GLUCOSE SERPL-MCNC: 78 MG/DL
HBA1C MFR BLD HPLC: 5.3 %
HCT VFR BLD CALC: 47.5 %
HDLC SERPL-MCNC: 78 MG/DL
HGB BLD-MCNC: 14.8 G/DL
IMM GRANULOCYTES NFR BLD AUTO: 0 %
LDLC SERPL-MCNC: 144 MG/DL
LYMPHOCYTES # BLD AUTO: 1.4 K/UL
LYMPHOCYTES NFR BLD AUTO: 25 %
M TB IFN-G BLD-IMP: NEGATIVE
MAN DIFF?: NORMAL
MCHC RBC-ENTMCNC: 30 PG
MCHC RBC-ENTMCNC: 31.2 G/DL
MCV RBC AUTO: 96.3 FL
MONOCYTES # BLD AUTO: 0.33 K/UL
MONOCYTES NFR BLD AUTO: 5.9 %
NEUTROPHILS # BLD AUTO: 3.65 K/UL
NEUTROPHILS NFR BLD AUTO: 65.2 %
NONHDLC SERPL-MCNC: 159 MG/DL
PLATELET # BLD AUTO: 268 K/UL
POTASSIUM SERPL-SCNC: 4.6 MMOL/L
PROT SERPL-MCNC: 7 G/DL
QUANTIFERON TB PLUS MITOGEN MINUS NIL: >10 IU/ML
QUANTIFERON TB PLUS NIL: 0.03 IU/ML
QUANTIFERON TB PLUS TB1 MINUS NIL: 0 IU/ML
QUANTIFERON TB PLUS TB2 MINUS NIL: 0 IU/ML
RBC # BLD: 4.93 M/UL
RBC # FLD: 14.3 %
SODIUM SERPL-SCNC: 141 MMOL/L
T4 SERPL-MCNC: 7.5 UG/DL
TRIGL SERPL-MCNC: 92 MG/DL
TSH SERPL-ACNC: 2.06 UIU/ML
WBC # FLD AUTO: 5.6 K/UL

## 2025-05-11 NOTE — HISTORY OF PRESENT ILLNESS
[FreeTextEntry1] : ANNUAL EXM  [de-identified] :  70 YO FEMALE HERE FOR ANNUAL  EXAM PT USED TO  WORK  AS   at [employer]  BUT WAS  LET GO LTE 2022 AND  A IS AT  [place]  OVERALL DOING WELL HAS SOME PAPERWORK FOR  EMPLOYMENT NO CP OR SOB

## 2025-05-11 NOTE — HISTORY OF PRESENT ILLNESS
[FreeTextEntry1] : ANNUAL EXM  [de-identified] :  70 YO FEMALE HERE FOR ANNUAL  EXAM PT USED TO  WORK  AS   at [employer]  BUT WAS  LET GO LTE 2022 AND  A IS AT  [place]  OVERALL DOING WELL HAS SOME PAPERWORK FOR  EMPLOYMENT NO CP OR SOB

## 2025-05-13 ENCOUNTER — NON-APPOINTMENT (OUTPATIENT)
Age: 70
End: 2025-05-13

## 2025-05-13 ENCOUNTER — APPOINTMENT (OUTPATIENT)
Dept: CARDIOLOGY | Facility: CLINIC | Age: 70
End: 2025-05-13

## 2025-05-13 VITALS
HEART RATE: 62 BPM | HEIGHT: 69 IN | OXYGEN SATURATION: 97 % | BODY MASS INDEX: 23.99 KG/M2 | DIASTOLIC BLOOD PRESSURE: 84 MMHG | SYSTOLIC BLOOD PRESSURE: 140 MMHG | WEIGHT: 162 LBS

## 2025-05-13 VITALS — SYSTOLIC BLOOD PRESSURE: 142 MMHG | DIASTOLIC BLOOD PRESSURE: 80 MMHG

## 2025-05-13 DIAGNOSIS — E78.2 MIXED HYPERLIPIDEMIA: ICD-10-CM

## 2025-05-13 DIAGNOSIS — Z00.00 ENCOUNTER FOR GENERAL ADULT MEDICAL EXAMINATION W/OUT ABNORMAL FINDINGS: ICD-10-CM

## 2025-05-13 DIAGNOSIS — E78.5 HYPERLIPIDEMIA, UNSPECIFIED: ICD-10-CM

## 2025-05-13 DIAGNOSIS — R53.83 OTHER FATIGUE: ICD-10-CM

## 2025-05-13 DIAGNOSIS — Z01.810 ENCOUNTER FOR PREPROCEDURAL CARDIOVASCULAR EXAMINATION: ICD-10-CM

## 2025-05-13 DIAGNOSIS — Z13.220 ENCOUNTER FOR SCREENING FOR LIPOID DISORDERS: ICD-10-CM

## 2025-05-13 PROCEDURE — 93000 ELECTROCARDIOGRAM COMPLETE: CPT | Mod: NC

## 2025-05-13 NOTE — CARDIOLOGY SUMMARY
[de-identified] : 5 13 2025: Sinus Rhythm  WITHIN NORMAL LIMITS [de-identified] : may 2025  total: 237  HDL: 78.  LDL : 144 .  Tgs: 92

## 2025-05-13 NOTE — DISCUSSION/SUMMARY
[Patient] : the patient [Risks] : risks [With Me] : with me [EKG obtained to assist in diagnosis and management of assessed problem(s)] : EKG obtained to assist in diagnosis and management of assessed problem(s) [___ Year(s)] : in [unfilled] year(s) [FreeTextEntry1] : This is a 70 year old woman with  dyslipidemia ,  here for Pre-operative cardiovascular risk evaluation and management  for knee replacement.   1)  Pre-operative cardiovascular risk evaluation and management :   bilateral knee replacement stress echo and transthoracic echocardiogram.  2)   elevated blood pressure without diagnosis of hypertension: white coat.  deit adn exercise .  home Bp check.  3) coronary artery disease screening : patient defers CT calcium score.

## 2025-05-13 NOTE — HISTORY OF PRESENT ILLNESS
[FreeTextEntry1] : reason :  Pre-operative cardiovascular risk evaluation and management FOR KNEE REPLACEMENT.  HPI for today: : 5 13 2025:   This is a 70 year old woman with  dyslipidemia ,  here for Pre-operative cardiovascular risk evaluation and management  for knee replacement.  no chest pain . no dyspnea on exertion  no dizziness no syncope. she exercises 5 days a week.   she takes her BP once a week. 110/80   and she does have history of white coat syndrome.  she does donate blood every 3 mths.     No smoking. No alcohol. No drugs.    Family history:  Father:  no myocardial infarction. +  Cerebro vascular accident  atrial fibrillation AND CVA STROKE  Mother :  no myocardial infarction. No cerebro vascualr accident  SMOKER AND copd  Siblings:  No myocardial infarction.  No CVA

## 2025-05-16 ENCOUNTER — APPOINTMENT (OUTPATIENT)
Dept: CARDIOLOGY | Facility: CLINIC | Age: 70
End: 2025-05-16
Payer: MEDICARE

## 2025-05-16 PROCEDURE — 93306 TTE W/DOPPLER COMPLETE: CPT

## 2025-05-20 ENCOUNTER — TRANSCRIPTION ENCOUNTER (OUTPATIENT)
Age: 70
End: 2025-05-20

## 2025-05-23 ENCOUNTER — APPOINTMENT (OUTPATIENT)
Dept: CARDIOLOGY | Facility: CLINIC | Age: 70
End: 2025-05-23
Payer: MEDICARE

## 2025-05-23 PROCEDURE — 93320 DOPPLER ECHO COMPLETE: CPT

## 2025-05-23 PROCEDURE — 93351 STRESS TTE COMPLETE: CPT

## 2025-05-28 ENCOUNTER — NON-APPOINTMENT (OUTPATIENT)
Age: 70
End: 2025-05-28

## 2025-05-28 NOTE — HISTORY OF PRESENT ILLNESS
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

## 2025-05-28 NOTE — PLAN
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

## 2025-06-02 ENCOUNTER — NON-APPOINTMENT (OUTPATIENT)
Age: 70
End: 2025-06-02

## 2025-06-04 ENCOUNTER — TRANSCRIPTION ENCOUNTER (OUTPATIENT)
Age: 70
End: 2025-06-04

## 2025-07-01 ENCOUNTER — APPOINTMENT (OUTPATIENT)
Dept: CARDIOLOGY | Facility: CLINIC | Age: 70
End: 2025-07-01

## 2025-07-08 ENCOUNTER — TRANSCRIPTION ENCOUNTER (OUTPATIENT)
Age: 70
End: 2025-07-08

## 2025-07-23 ENCOUNTER — APPOINTMENT (OUTPATIENT)
Dept: DERMATOLOGY | Facility: CLINIC | Age: 70
End: 2025-07-23
Payer: MEDICARE

## 2025-07-23 PROCEDURE — 99213 OFFICE O/P EST LOW 20 MIN: CPT
